# Patient Record
Sex: FEMALE | Race: WHITE | Employment: FULL TIME | URBAN - METROPOLITAN AREA
[De-identification: names, ages, dates, MRNs, and addresses within clinical notes are randomized per-mention and may not be internally consistent; named-entity substitution may affect disease eponyms.]

---

## 2018-06-27 ENCOUNTER — OFFICE VISIT (OUTPATIENT)
Dept: FAMILY MEDICINE CLINIC | Age: 38
End: 2018-06-27

## 2018-06-27 VITALS
BODY MASS INDEX: 20.41 KG/M2 | HEART RATE: 70 BPM | DIASTOLIC BLOOD PRESSURE: 64 MMHG | HEIGHT: 70 IN | WEIGHT: 142.6 LBS | OXYGEN SATURATION: 98 % | TEMPERATURE: 97.4 F | SYSTOLIC BLOOD PRESSURE: 110 MMHG

## 2018-06-27 DIAGNOSIS — Z12.83 SKIN EXAM FOR MALIGNANT NEOPLASM: ICD-10-CM

## 2018-06-27 DIAGNOSIS — B35.1 ONYCHOMYCOSIS: Primary | ICD-10-CM

## 2018-06-27 DIAGNOSIS — Z76.89 ESTABLISHING CARE WITH NEW DOCTOR, ENCOUNTER FOR: ICD-10-CM

## 2018-06-27 DIAGNOSIS — B35.3 TINEA PEDIS OF LEFT FOOT: ICD-10-CM

## 2018-06-27 PROCEDURE — 99203 OFFICE O/P NEW LOW 30 MIN: CPT | Performed by: FAMILY MEDICINE

## 2018-06-27 RX ORDER — TERBINAFINE HYDROCHLORIDE 250 MG/1
TABLET ORAL
Qty: 14 TABLET | Refills: 3 | Status: SHIPPED | OUTPATIENT
Start: 2018-06-27 | End: 2018-09-28 | Stop reason: SDUPTHER

## 2018-06-27 ASSESSMENT — PATIENT HEALTH QUESTIONNAIRE - PHQ9
2. FEELING DOWN, DEPRESSED OR HOPELESS: 0
SUM OF ALL RESPONSES TO PHQ QUESTIONS 1-9: 0
SUM OF ALL RESPONSES TO PHQ9 QUESTIONS 1 & 2: 0
1. LITTLE INTEREST OR PLEASURE IN DOING THINGS: 0

## 2018-06-27 ASSESSMENT — ENCOUNTER SYMPTOMS
BLOOD IN STOOL: 0
COUGH: 0
SHORTNESS OF BREATH: 0
CONSTIPATION: 0
ABDOMINAL PAIN: 0
VOMITING: 0
DIARRHEA: 0
NAUSEA: 0

## 2018-09-28 ENCOUNTER — OFFICE VISIT (OUTPATIENT)
Dept: FAMILY MEDICINE CLINIC | Age: 38
End: 2018-09-28
Payer: COMMERCIAL

## 2018-09-28 VITALS
WEIGHT: 145.2 LBS | DIASTOLIC BLOOD PRESSURE: 70 MMHG | TEMPERATURE: 96.4 F | SYSTOLIC BLOOD PRESSURE: 118 MMHG | OXYGEN SATURATION: 98 % | HEART RATE: 79 BPM | BODY MASS INDEX: 20.69 KG/M2

## 2018-09-28 DIAGNOSIS — B35.1 ONYCHOMYCOSIS: Primary | ICD-10-CM

## 2018-09-28 DIAGNOSIS — R21 RASH: ICD-10-CM

## 2018-09-28 PROCEDURE — 99213 OFFICE O/P EST LOW 20 MIN: CPT | Performed by: FAMILY MEDICINE

## 2018-09-28 PROCEDURE — 1036F TOBACCO NON-USER: CPT | Performed by: FAMILY MEDICINE

## 2018-09-28 PROCEDURE — G8427 DOCREV CUR MEDS BY ELIG CLIN: HCPCS | Performed by: FAMILY MEDICINE

## 2018-09-28 PROCEDURE — G8420 CALC BMI NORM PARAMETERS: HCPCS | Performed by: FAMILY MEDICINE

## 2018-09-28 RX ORDER — TERBINAFINE HYDROCHLORIDE 250 MG/1
TABLET ORAL
Qty: 14 TABLET | Refills: 3 | Status: SHIPPED | OUTPATIENT
Start: 2018-09-28 | End: 2020-11-03

## 2018-10-04 ENCOUNTER — TELEPHONE (OUTPATIENT)
Dept: FAMILY MEDICINE CLINIC | Age: 38
End: 2018-10-04

## 2018-10-04 RX ORDER — DOXYCYCLINE HYCLATE 100 MG
100 TABLET ORAL 2 TIMES DAILY
Qty: 14 TABLET | Refills: 0 | Status: SHIPPED | OUTPATIENT
Start: 2018-10-04 | End: 2018-10-11

## 2019-06-04 ENCOUNTER — HOSPITAL ENCOUNTER (OUTPATIENT)
Dept: MAMMOGRAPHY | Age: 39
Discharge: HOME | End: 2019-06-04
Payer: COMMERCIAL

## 2019-06-04 DIAGNOSIS — Z12.31 ENCOUNTER FOR SCREENING MAMMOGRAM FOR BREAST CANCER: ICD-10-CM

## 2019-06-04 PROCEDURE — 77067 SCR MAMMO BI INCL CAD: CPT

## 2020-11-03 ENCOUNTER — OFFICE VISIT (OUTPATIENT)
Dept: INTERNAL MEDICINE CLINIC | Age: 40
End: 2020-11-03
Payer: COMMERCIAL

## 2020-11-03 VITALS
DIASTOLIC BLOOD PRESSURE: 78 MMHG | RESPIRATION RATE: 14 BRPM | SYSTOLIC BLOOD PRESSURE: 123 MMHG | OXYGEN SATURATION: 98 % | BODY MASS INDEX: 21.42 KG/M2 | HEART RATE: 83 BPM | HEIGHT: 71 IN | WEIGHT: 153 LBS

## 2020-11-03 PROCEDURE — G8484 FLU IMMUNIZE NO ADMIN: HCPCS | Performed by: INTERNAL MEDICINE

## 2020-11-03 PROCEDURE — 99395 PREV VISIT EST AGE 18-39: CPT | Performed by: INTERNAL MEDICINE

## 2020-11-03 RX ORDER — CLOTRIMAZOLE AND BETAMETHASONE DIPROPIONATE 10; .64 MG/G; MG/G
CREAM TOPICAL
Qty: 15 G | Refills: 0 | Status: SHIPPED | OUTPATIENT
Start: 2020-11-03 | End: 2021-02-19

## 2020-11-03 RX ORDER — NORETHINDRONE ACETATE AND ETHINYL ESTRADIOL, ETHINYL ESTRADIOL AND FERROUS FUMARATE 1MG-10(24)
KIT ORAL
COMMUNITY
Start: 2020-10-31

## 2020-11-03 ASSESSMENT — PATIENT HEALTH QUESTIONNAIRE - PHQ9
SUM OF ALL RESPONSES TO PHQ QUESTIONS 1-9: 0
SUM OF ALL RESPONSES TO PHQ QUESTIONS 1-9: 0
1. LITTLE INTEREST OR PLEASURE IN DOING THINGS: 0
SUM OF ALL RESPONSES TO PHQ9 QUESTIONS 1 & 2: 0
2. FEELING DOWN, DEPRESSED OR HOPELESS: 0
DEPRESSION UNABLE TO ASSESS: FUNCTIONAL CAPACITY MOTIVATION LIMITS ACCURACY
SUM OF ALL RESPONSES TO PHQ QUESTIONS 1-9: 0

## 2020-11-03 NOTE — PROGRESS NOTES
Internal Medicine  History and Physical        Zia Gonzalez  YOB: 1980    Date of Service:  11/3/2020      Chief Complaint:   Zia Gonzalez is a 46 y.o. female who presents for a comprehensive physical    HPI:   Prev w Dr Milton Heimlich Dr Lorrin Mark and now w MARY Bang Maxim    No recent fasting lab. Father w hx of high chol, also HTN. mother w HTN and recent eval for transient global amnesia. The past month has noticed a red dry patch on forehead, can be itchy. We can t4ry therapy but if does not respond then has seen Adele Collins in the past.    Still very active, played volleyball in colleg at [place]. At times w some knee aches, still plays volleyball in a women's league. When is running at times will notice sl aching. Only started when was running over the summer and now resolved, still running. Preventive Care:  Health Maintenance   Topic Date Due    Varicella vaccine (1 of 2 - 2-dose childhood series) 11/30/1982    HIV screen  11/30/1996    DTaP/Tdap/Td vaccine (1 - Tdap) 11/30/2000    Cervical cancer screen  11/30/2002    Flu vaccine (1) 09/01/2020    Hepatitis A vaccine  Aged Out    Hepatitis B vaccine  Aged Out    Hib vaccine  Aged Out    Meningococcal (ACWY) vaccine  Aged Out    Pneumococcal 0-64 years Vaccine  Aged Out              No Known Allergies  Current Outpatient Medications   Medication Sig Dispense Refill    LO LOESTRIN FE 1 MG-10 MCG / 10 MCG tablet TAKE 1 TABLET BY MOUTH ONCE DAILY       No current facility-administered medications for this visit. History reviewed. No pertinent past medical history. History reviewed. No pertinent surgical history.   Family History   Problem Relation Age of Onset    High Blood Pressure Mother     High Blood Pressure Father     High Cholesterol Father      Social History     Socioeconomic History    Marital status:      Spouse name: Not on file    Number of children: Not on file    Years of education: Not on file    Highest education level: Not on file   Occupational History    Occupation:      Comment: [address] Financial resource strain: Not on file    Food insecurity     Worry: Not on file     Inability: Not on file   [employer] needs     Medical: Not on file     Non-medical: Not on file   Tobacco Use    Smoking status: Never Smoker    Smokeless tobacco: Never Used   Substance and Sexual Activity    Alcohol use: Yes     Comment: weekly    Drug use: No    Sexual activity: Yes     Partners: Male     Birth control/protection: Condom   Lifestyle    Physical activity     Days per week: Not on file     Minutes per session: Not on file    Stress: Not on file   Relationships    Social connections     Talks on phone: Not on file     Gets together: Not on file     Attends Scientologist service: Not on file     Active member of club or organization: Not on file     Attends meetings of clubs or organizations: Not on file     Relationship status: Not on file    Intimate partner violence     Fear of current or ex partner: Not on file     Emotionally abused: Not on file     Physically abused: Not on file     Forced sexual activity: Not on file   Other Topics Concern    Not on file   Social History Narrative    Not on file       Review of Systems:  A comprehensive review of systems was negative except for what was noted in the HPI. Wt Readings from Last 3 Encounters:   11/03/20 153 lb (69.4 kg)   09/28/18 145 lb 3.2 oz (65.9 kg)   06/27/18 142 lb 9.6 oz (64.7 kg)     BP Readings from Last 3 Encounters:   11/03/20 123/78   09/28/18 118/70   06/27/18 110/64       Physical Exam:   Vitals:    11/03/20 1416   BP: 123/78   Pulse: 83   Resp: 14   SpO2: 98%   Weight: 153 lb (69.4 kg)   Height: 5' 11\" (1.803 m)     Body mass index is 21.34 kg/m². Constitutional: She is oriented to person, place, and time. She appears well-developed and well-nourished.  No distress. HEENT:  Head: Normocephalic and atraumatic. Eyes: Conjunctivae and extraocular motions are normal. Pupils are equal, round, and reactive to light. Neck: Neck supple. No JVD present. No mass and no thyromegaly present. Cardiovascular: Normal rate, regular rhythm, normal heart sounds and intact distal pulses. Exam reveals no gallop and no friction rub. No murmur heard. Pulmonary/Chest: Effort normal and breath sounds normal. No respiratory distress. She has no wheezes, rhonchi or rales. Abdominal: Soft, non-tender. Bowel sounds and aorta are normal. She exhibits no organomegaly, mass or bruit. Musculoskeletal: Normal range of motion, no synovitis. She exhibits no edema. BILAT KNEES W/O EFFUSION OR LAXITY, CREPITUS  Neurological: She is alert and oriented to person, place, and time. No cranial nerve deficit. Skin: Skin is warm and dry. There is no rash or erythema. - EXCELT CENTRAL FOREHEAD W APPROX 2CM AREA TR ERYTHEMA AND SL RAISED, TR SCALING  Psychiatric: She has a normal mood and affect. Her speech is normal and behavior is normal. Judgment, cognition and memory are normal.         Assessment/Plan:  John Minor was seen today for established new doctor. Diagnoses and all orders for this visit:    Routine general medical examination at a health care facility - Overall general medical exam appears benign, other assessments as noted and testing is as noted below. -     Comprehensive Metabolic Panel; Future  -     CBC Auto Differential; Future  -     Lipid Panel; Future  -     TSH without Reflex; Future  -     VITAMIN D 25 HYDROXY; Future  -     TYPE AND SCREEN; Future    Benign skin lesion of forehead- SUSPECT MAY HAVE FUNGAL INFECTION, TRIAL LOTRISONE. ALSO DDX INCL ECZEMA AND STEROID IN LOTRISONE MAY HELP. HOWEVER IF NO BETTER 7-10D THEN FOR F/U W [facility] DERM  -     clotrimazole-betamethasone (LOTRISONE) 1-0.05 % cream; Apply topically 2 times daily.     Acute pain of both knees- LIKELY MILD TENDINITIS W NEW EXERCISE AND NOW RESOLVED. LESS LIKELY W OA.  HOWEVER IF SX RECUR AND ARE PERSISTENT, THEN CHECK XRAY  -     XR KNEE BILATERAL STANDING;  Future

## 2021-02-19 ENCOUNTER — OFFICE VISIT (OUTPATIENT)
Dept: INTERNAL MEDICINE CLINIC | Age: 41
End: 2021-02-19
Payer: COMMERCIAL

## 2021-02-19 VITALS
BODY MASS INDEX: 21.34 KG/M2 | RESPIRATION RATE: 15 BRPM | WEIGHT: 153 LBS | DIASTOLIC BLOOD PRESSURE: 84 MMHG | SYSTOLIC BLOOD PRESSURE: 132 MMHG | OXYGEN SATURATION: 98 % | HEART RATE: 83 BPM

## 2021-02-19 DIAGNOSIS — R03.0 ELEVATED BLOOD PRESSURE READING WITHOUT DIAGNOSIS OF HYPERTENSION: Primary | ICD-10-CM

## 2021-02-19 PROCEDURE — 99214 OFFICE O/P EST MOD 30 MIN: CPT | Performed by: INTERNAL MEDICINE

## 2021-02-19 PROCEDURE — G8484 FLU IMMUNIZE NO ADMIN: HCPCS | Performed by: INTERNAL MEDICINE

## 2021-02-19 PROCEDURE — G8420 CALC BMI NORM PARAMETERS: HCPCS | Performed by: INTERNAL MEDICINE

## 2021-02-19 PROCEDURE — G8427 DOCREV CUR MEDS BY ELIG CLIN: HCPCS | Performed by: INTERNAL MEDICINE

## 2021-02-19 PROCEDURE — 1036F TOBACCO NON-USER: CPT | Performed by: INTERNAL MEDICINE

## 2021-02-19 ASSESSMENT — PATIENT HEALTH QUESTIONNAIRE - PHQ9
1. LITTLE INTEREST OR PLEASURE IN DOING THINGS: 0
DEPRESSION UNABLE TO ASSESS: FUNCTIONAL CAPACITY MOTIVATION LIMITS ACCURACY
SUM OF ALL RESPONSES TO PHQ QUESTIONS 1-9: 0

## 2021-02-19 NOTE — PATIENT INSTRUCTIONS
Patient Education        DASH Diet: Care Instructions  Your Care Instructions     The DASH diet is an eating plan that can help lower your blood pressure. DASH stands for Dietary Approaches to Stop Hypertension. Hypertension is high blood pressure. The DASH diet focuses on eating foods that are high in calcium, potassium, and magnesium. These nutrients can lower blood pressure. The foods that are highest in these nutrients are fruits, vegetables, low-fat dairy products, nuts, seeds, and legumes. But taking calcium, potassium, and magnesium supplements instead of eating foods that are high in those nutrients does not have the same effect. The DASH diet also includes whole grains, fish, and poultry. The DASH diet is one of several lifestyle changes your doctor may recommend to lower your high blood pressure. Your doctor may also want you to decrease the amount of sodium in your diet. Lowering sodium while following the DASH diet can lower blood pressure even further than just the DASH diet alone. Follow-up care is a key part of your treatment and safety. Be sure to make and go to all appointments, and call your doctor if you are having problems. It's also a good idea to know your test results and keep a list of the medicines you take. How can you care for yourself at home? Following the DASH diet  · Eat 4 to 5 servings of fruit each day. A serving is 1 medium-sized piece of fruit, ½ cup chopped or canned fruit, 1/4 cup dried fruit, or 4 ounces (½ cup) of fruit juice. Choose fruit more often than fruit juice. · Eat 4 to 5 servings of vegetables each day. A serving is 1 cup of lettuce or raw leafy vegetables, ½ cup of chopped or cooked vegetables, or 4 ounces (½ cup) of vegetable juice. Choose vegetables more often than vegetable juice. · Get 2 to 3 servings of low-fat and fat-free dairy each day. A serving is 8 ounces of milk, 1 cup of yogurt, or 1 ½ ounces of cheese. · Eat 6 to 8 servings of grains each day. meals using beans and peas. Add garbanzo or kidney beans to salads. Make burritos and tacos with mashed mast beans or black beans. Where can you learn more? Go to [email or web address] org and sign in to your [employer] account. Enter F925 in the [facility] box to learn more about \"DASH Diet: Care Instructions. \"     If you do not have an account, please click on the \"Sign Up Now\" link. Current as of: December 16, 2019               Content Version: 12.6  © 5362-1637 [employer], [employer]. Care instructions adapted under license by [facility] ([facility]). If you have questions about a medical condition or this instruction, always ask your healthcare professional. [address] any warranty or liability for your use of this information. wt loss would help lower BP significantly, also a low salt diet. Also rec to restart regular cardiovascular exercise, aim for minimum of 30min/day, or more if possible. rec to aim for <2 grams/2000mg a day. Try to cut down coffee to either decaf or one caffeinated/day. Recommend also a BP cuff at home, track a few times/week and record. If consistently high >140/90 then call us back. Otherwise if 130-140 range we'll monitor for now as we work on lifestyle changes. Also please let us know your BP trend, ranges in the next 2 weeks.

## 2021-02-19 NOTE — PROGRESS NOTES
Edie Mcwilliams  1980 02/19/21    SUBJECTIVE:  At GYN- Vivien Minor CNP- visit  2-3 weeks ago BP was high to 138/88. Then recheck was the same, a week later also 138/88. Has no sx cp or sob, lt headed, blurred vision. Very strong fhx of HTN in both parents and also brother at 23yo. Reviewed BP since 2018, has trended higher gradually the past 3 yrs. Had COVID infection in Nov w some fatigue afterwards and has not been exercising now as regularly. Typically runs outside. Been on octs the past yr. Caffeine 1-2 cups/day, alc one wine or white claw 3-4x/week. 142/90 L arm    OBJECTIVE:    /84   Pulse 83   Resp 15   Wt 153 lb (69.4 kg)   SpO2 98%   BMI 21.34 kg/m²     Physical Exam  Vitals signs reviewed. Constitutional:       Appearance: She is well-developed. HENT:      Head: Normocephalic and atraumatic. Eyes:      General: No scleral icterus. Right eye: No discharge. Left eye: No discharge. Conjunctiva/sclera: Conjunctivae normal.      Pupils: Pupils are equal, round, and reactive to light. Neck:      Musculoskeletal: Neck supple. Thyroid: No thyromegaly. Vascular: No JVD. Trachea: No tracheal deviation. Cardiovascular:      Rate and Rhythm: Normal rate and regular rhythm. Heart sounds: Normal heart sounds. No murmur. No friction rub. No gallop. Pulmonary:      Effort: Pulmonary effort is normal. No respiratory distress. Breath sounds: Normal breath sounds. No wheezing or rales. Abdominal:      General: Bowel sounds are normal. There is no distension. Palpations: Abdomen is soft. There is no mass. Tenderness: There is no abdominal tenderness. There is no guarding or rebound. Hernia: No hernia is present. Lymphadenopathy:      Cervical: No cervical adenopathy. Skin:     General: Skin is warm and dry. Neurological:      Mental Status: She is alert.    Psychiatric:         Mood and Affect: Mood normal. ASSESSMENT:    1. Elevated blood pressure reading without diagnosis of hypertension        PLAN:    Abimbola Holguin was seen today for hypertension. Diagnoses and all orders for this visit:    Elevated blood pressure reading without diagnosis of hypertension- CERTAINLY W UNDERLYING GENETIC PREDISPOSITION TO ESSENTIAL HTN, PERHAPS ALSO BROUGHT ON BY LESS ACTIVITY AND SOME WT GAIN AFTER COVID. THERE ARE LIKELY MULTIFACTORIAL ISSUES RELATED TO - IN SMALL PART- HER OCPS, ALSO MILD ALC CONSUMPTION, HAS MINIMAL FAST FOOD BUT COULD HAVE SODIUM INTAKE THAT MAY BE LOWERED TO <2G/DAY. FOR NOW SHE WILL CONT MONITOR BP AT HOME, WORK ON LOW SODIUM AND DASH DIET, ALSO START DAILY EXERCISE W AIM TO LOSE ~10q# IF POSSIBLE AND WE'LL REASSESS IN THREE MONTHS. IF BP REMAINS CONSISTENTLY HIGH WE'LL THEN NEED TO CONSIDER POSSIBLE MEDICAL THERAPY. CHECKING LAB ALSO FOR R/O SECONDARY CAUSES. -     Urinalysis with Microscopic; Future  -     Comprehensive Metabolic Panel; Future  -     CBC Auto Differential; Future  -     Lipid Panel; Future  -     TSH without Reflex; Future  -     Urinalysis with Microscopic; Future    PT INSTRUCTIONS:  wt loss would help lower BP significantly, also a low salt diet. Also rec to restart regular cardiovascular exercise, aim for minimum of 30min/day, or more if possible. rec to aim for <2 grams/2000mg a day. Try to cut down coffee to either decaf or one caffeinated/day.

## 2021-04-09 DIAGNOSIS — N39.0 URINARY TRACT INFECTION WITHOUT HEMATURIA, SITE UNSPECIFIED: Primary | ICD-10-CM

## 2021-04-09 RX ORDER — CIPROFLOXACIN 250 MG/1
250 TABLET, FILM COATED ORAL 2 TIMES DAILY
Qty: 10 TABLET | Refills: 0 | Status: SHIPPED | OUTPATIENT
Start: 2021-04-09 | End: 2021-04-14

## 2021-05-19 ENCOUNTER — OFFICE VISIT (OUTPATIENT)
Dept: INTERNAL MEDICINE CLINIC | Age: 41
End: 2021-05-19
Payer: COMMERCIAL

## 2021-05-19 VITALS
OXYGEN SATURATION: 98 % | HEART RATE: 71 BPM | SYSTOLIC BLOOD PRESSURE: 122 MMHG | DIASTOLIC BLOOD PRESSURE: 78 MMHG | WEIGHT: 153 LBS | RESPIRATION RATE: 14 BRPM | BODY MASS INDEX: 21.34 KG/M2

## 2021-05-19 DIAGNOSIS — R03.0 BLOOD PRESSURE ELEVATED WITHOUT HISTORY OF HTN: Primary | ICD-10-CM

## 2021-05-19 DIAGNOSIS — N30.00 ACUTE CYSTITIS WITHOUT HEMATURIA: ICD-10-CM

## 2021-05-19 LAB
BILIRUBIN URINE: NEGATIVE
BLOOD, URINE: NEGATIVE
CLARITY: CLEAR
COLOR: ABNORMAL
EPITHELIAL CELLS, UA: 1 /HPF (ref 0–5)
GLUCOSE URINE: NEGATIVE MG/DL
HYALINE CASTS: 1 /LPF (ref 0–8)
KETONES, URINE: NEGATIVE MG/DL
LEUKOCYTE ESTERASE, URINE: NEGATIVE
MICROSCOPIC EXAMINATION: YES
NITRITE, URINE: NEGATIVE
PH UA: 6 (ref 5–8)
PROTEIN UA: 30 MG/DL
RBC UA: 2 /HPF (ref 0–4)
SPECIFIC GRAVITY UA: 1.02 (ref 1–1.03)
URINE TYPE: ABNORMAL
UROBILINOGEN, URINE: 0.2 E.U./DL
WBC UA: 1 /HPF (ref 0–5)

## 2021-05-19 PROCEDURE — G8427 DOCREV CUR MEDS BY ELIG CLIN: HCPCS | Performed by: INTERNAL MEDICINE

## 2021-05-19 PROCEDURE — G8420 CALC BMI NORM PARAMETERS: HCPCS | Performed by: INTERNAL MEDICINE

## 2021-05-19 PROCEDURE — 1036F TOBACCO NON-USER: CPT | Performed by: INTERNAL MEDICINE

## 2021-05-19 PROCEDURE — 99213 OFFICE O/P EST LOW 20 MIN: CPT | Performed by: INTERNAL MEDICINE

## 2021-05-19 NOTE — PROGRESS NOTES
Suzanne Andrade  1980 05/19/21    SUBJECTIVE:    HAS HAD WT LOSS AND ALSO WORKING ON LOW SODIUM. AT HOME BP ~120S. COUNTING SODIUM TOO AND HAS CUT OUT/DOWN BREADS, CHEESES, PROCESSED MEATS. HAS LESS ETOH AND CAFFEINE INTAKE, NOW WORKING OUT 5D/WEEK. HAD MILD UTI - ONLY FR UA AND NOT SX- LAST VISIT IN APR, STARTED COURSE ABX. NO SX NOTED. OBJECTIVE:    /78   Pulse 71   Resp 14   Wt 153 lb (69.4 kg)   SpO2 98%   BMI 21.34 kg/m²     Physical Exam  Vitals reviewed. Constitutional:       Appearance: She is well-developed. Cardiovascular:      Rate and Rhythm: Normal rate and regular rhythm. Heart sounds: Normal heart sounds. No murmur heard. No friction rub. No gallop. Pulmonary:      Effort: Pulmonary effort is normal. No respiratory distress. Breath sounds: Normal breath sounds. No wheezing or rales. Abdominal:      General: Bowel sounds are normal. There is no distension. Palpations: Abdomen is soft. Tenderness: There is no abdominal tenderness. Musculoskeletal:      Cervical back: Neck supple. Right lower leg: No edema. Left lower leg: No edema. Neurological:      Mental Status: She is alert. ASSESSMENT:    1. Blood pressure elevated without history of HTN    2. Acute cystitis without hematuria        PLAN:    Mingo Peters was seen today for blood pressure check. Diagnoses and all orders for this visit:    Blood pressure elevated without history of HTN- BP HAS NORMALIZED W AGGRESSIVE LIFESTYLE CHANGES, CUTTING DOWN ON CARBS, SODIUM, ALSO INCR EXERCISE     Acute cystitis without hematuria- RECHECK UA TO SEE IF PRIOR INFECTION CLEARED  -     Urinalysis with Microscopic;  Future    CHANGE NEXT APPT ONE YR FOR PHYSICAL SINCE RECENT LABS AND EVAL BENIGN

## 2021-05-20 DIAGNOSIS — R80.9 PROTEINURIA, UNSPECIFIED TYPE: Primary | ICD-10-CM

## 2021-05-20 NOTE — RESULT ENCOUNTER NOTE
Call pt, urine indicates prior infection resolved. There may be ?trace protein present? ADD URINE MICROALBUMIN, WHICH IS A MORE SENSITIVE TEST.   IF THIS IS ABNORMAL WE MAY NEED SOME ADDITIONAL F/U TESTS BUT IF IS NEGATIVE THEN THE TRACE PROTEIN IS JUST A FALSE POSITIVE AND WE'LL LET HER KNOW WHEN  RESULTS ARE BACK    DX PROTEINURIA

## 2021-05-21 ENCOUNTER — HOSPITAL ENCOUNTER (OUTPATIENT)
Dept: MAMMOGRAPHY | Age: 41
Discharge: HOME | End: 2021-05-21
Payer: COMMERCIAL

## 2021-05-21 DIAGNOSIS — Z12.31 VISIT FOR SCREENING MAMMOGRAM: ICD-10-CM

## 2021-05-21 PROCEDURE — 77067 SCR MAMMO BI INCL CAD: CPT

## 2021-12-21 ENCOUNTER — OFFICE VISIT (OUTPATIENT)
Dept: INTERNAL MEDICINE CLINIC | Age: 41
End: 2021-12-21
Payer: COMMERCIAL

## 2021-12-21 VITALS
DIASTOLIC BLOOD PRESSURE: 85 MMHG | WEIGHT: 146 LBS | OXYGEN SATURATION: 99 % | RESPIRATION RATE: 14 BRPM | HEIGHT: 71 IN | HEART RATE: 110 BPM | SYSTOLIC BLOOD PRESSURE: 137 MMHG | BODY MASS INDEX: 20.44 KG/M2

## 2021-12-21 DIAGNOSIS — R59.0 AXILLARY LYMPHADENOPATHY: Primary | ICD-10-CM

## 2021-12-21 PROCEDURE — 1036F TOBACCO NON-USER: CPT | Performed by: INTERNAL MEDICINE

## 2021-12-21 PROCEDURE — G8420 CALC BMI NORM PARAMETERS: HCPCS | Performed by: INTERNAL MEDICINE

## 2021-12-21 PROCEDURE — 99213 OFFICE O/P EST LOW 20 MIN: CPT | Performed by: INTERNAL MEDICINE

## 2021-12-21 PROCEDURE — G8484 FLU IMMUNIZE NO ADMIN: HCPCS | Performed by: INTERNAL MEDICINE

## 2021-12-21 PROCEDURE — G8427 DOCREV CUR MEDS BY ELIG CLIN: HCPCS | Performed by: INTERNAL MEDICINE

## 2021-12-21 NOTE — PROGRESS NOTES
Sean Monreal  1980 12/21/21    SUBJECTIVE:    Switched to an alternative deodorant in June which caused some irritation and a couple of small lumps. Has since switched to a different deodorant. No itching, never had any visible rash. No fever, chills, breast lumps. Is coming due soon for GYN eval.    She has fhx of breast CA MGM, did have benign mammogram this summer. Is unvaccinated so no shot to cause any LAD. OBJECTIVE:    /85   Pulse 110   Resp 14   Ht 5' 11\" (1.803 m)   Wt 146 lb (66.2 kg)   SpO2 99%   BMI 20.36 kg/m²     Physical Exam  Constitutional:       Appearance: Normal appearance. Musculoskeletal:      Comments: Sl swelling, ?tendinous vs lymph node swelling at distal axillary region   Skin:     Findings: No erythema or rash. Neurological:      Mental Status: She is alert. ASSESSMENT:    1. Axillary lymphadenopathy        PLAN:    Russell Bliss was seen today for cyst.    Diagnoses and all orders for this visit:    Axillary lymphadenopathy- unsure if swollen area is significant LN/lymphadenopathy, set up for eval fr surgery.   Cont f/u also routine mammograms thr her OB/GYN  -     Chris Patel MD, General Surgery, [facility]

## 2022-01-13 ENCOUNTER — OFFICE VISIT (OUTPATIENT)
Dept: SURGERY | Age: 42
End: 2022-01-13
Payer: COMMERCIAL

## 2022-01-13 VITALS
HEART RATE: 72 BPM | BODY MASS INDEX: 20.35 KG/M2 | DIASTOLIC BLOOD PRESSURE: 70 MMHG | WEIGHT: 145.4 LBS | SYSTOLIC BLOOD PRESSURE: 134 MMHG | HEIGHT: 71 IN | OXYGEN SATURATION: 99 %

## 2022-01-13 DIAGNOSIS — M79.89 LEFT AXILLARY SWELLING: Primary | ICD-10-CM

## 2022-01-13 PROCEDURE — G8484 FLU IMMUNIZE NO ADMIN: HCPCS | Performed by: PHYSICIAN ASSISTANT

## 2022-01-13 PROCEDURE — 99203 OFFICE O/P NEW LOW 30 MIN: CPT | Performed by: PHYSICIAN ASSISTANT

## 2022-01-13 PROCEDURE — G8427 DOCREV CUR MEDS BY ELIG CLIN: HCPCS | Performed by: PHYSICIAN ASSISTANT

## 2022-01-13 PROCEDURE — G8420 CALC BMI NORM PARAMETERS: HCPCS | Performed by: PHYSICIAN ASSISTANT

## 2022-01-13 PROCEDURE — 1036F TOBACCO NON-USER: CPT | Performed by: PHYSICIAN ASSISTANT

## 2022-01-13 ASSESSMENT — PATIENT HEALTH QUESTIONNAIRE - PHQ9
1. LITTLE INTEREST OR PLEASURE IN DOING THINGS: 0
2. FEELING DOWN, DEPRESSED OR HOPELESS: 0
SUM OF ALL RESPONSES TO PHQ QUESTIONS 1-9: 0
SUM OF ALL RESPONSES TO PHQ QUESTIONS 1-9: 0
SUM OF ALL RESPONSES TO PHQ9 QUESTIONS 1 & 2: 0
SUM OF ALL RESPONSES TO PHQ QUESTIONS 1-9: 0
SUM OF ALL RESPONSES TO PHQ QUESTIONS 1-9: 0

## 2022-01-13 NOTE — PROGRESS NOTES
Chief Complaint   Patient presents with    New Patient     NP-Axillary Lymphademophay L side        SUBJECTIVE:  HPI: Patient presents today with complaints of L axillary swelling concern for lymphadenopathy vs cyst as recommended for consult by her PCP. She has had these \"lumps\" since about June of last year. At that time, she changed her deodorant, and had a local reaction with redness, itching and swelling. At that time, while the patient was scratching her axilla, she noticed 2 small lumps. She has since changed her deodorant, and no longer has symptoms, but the lumps remain. They are unchanged. Pt denies any unexpected weight loss, pain, illness, drainage or size change in the lumps. I have reviewed the patient's (pertinent information to this visit) medical history, family history (scanned in  the Media tab under \"patient questioner\"), social history and review of systems with the patient today in the office. No past surgical history on file. No past medical history on file.   Family History   Problem Relation Age of Onset    High Blood Pressure Mother     High Blood Pressure Father     High Cholesterol Father     High Blood Pressure Brother     Breast Cancer Maternal Grandmother      Social History     Socioeconomic History    Marital status:      Spouse name: Not on file    Number of children: Not on file    Years of education: Not on file    Highest education level: Not on file   Occupational History    Occupation:      Comment: [employer]   Tobacco Use    Smoking status: Never Smoker    Smokeless tobacco: Never Used   Substance and Sexual Activity    Alcohol use: Yes     Comment: weekly    Drug use: No    Sexual activity: Yes     Partners: Male     Birth control/protection: Condom   Other Topics Concern    Not on file   Social History Narrative    Not on file     Social Determinants of Health     Financial Resource Strain:     Difficulty of Paying Living Expenses: Not on file   Food Insecurity:     Worried About Running Out of Food in the Last Year: Not on file    Ran Out of Food in the Last Year: Not on file   Transportation Needs:     Lack of Transportation (Medical): Not on file    Lack of Transportation (Non-Medical): Not on file   Physical Activity:     Days of Exercise per Week: Not on file    Minutes of Exercise per Session: Not on file   Stress:     Feeling of Stress : Not on file   Social Connections:     Frequency of Communication with Friends and Family: Not on file    Frequency of Social Gatherings with Friends and Family: Not on file    Attends Anabaptism Services: Not on file    Active Member of [address] [place] [address] Class[employer]s or Organizations: Not on file    Attends Club or Organization Meetings: Not on file    Marital Status: Not on file   Intimate Partner Violence:     Fear of Current or Ex-Partner: Not on file    Emotionally Abused: Not on file    Physically Abused: Not on file    Sexually Abused: Not on file   Housing Stability:     Unable to Pay for Housing in the Last Year: Not on file    Number of [place] in the Last Year: Not on file    Unstable Housing in the Last Year: Not on file       Current Outpatient Medications   Medication Sig Dispense Refill    LO LOESTRIN FE 1 MG-10 MCG / 10 MCG tablet TAKE 1 TABLET BY MOUTH ONCE DAILY       No current facility-administered medications for this visit. No Known Allergies    Review of Systems:       Review of Systems   Constitutional: Negative for chills and fever. HENT: Negative for ear pain, mouth sores, sore throat and tinnitus. Eyes: Negative for photophobia, redness and itching. Respiratory: Negative for apnea, choking and stridor. Cardiovascular: Negative for chest pain and palpitations. Gastrointestinal: Negative for anal bleeding, constipation and rectal pain. Endocrine: Negative for polydipsia. Genitourinary: Negative for enuresis, flank pain and hematuria. Musculoskeletal: Negative for back pain, joint swelling and myalgias. Skin: Negative for color change and pallor. L Axilla \"lumps\"   Allergic/Immunologic: Negative for environmental allergies. Neurological: Negative for syncope and speech difficulty. Psychiatric/Behavioral: Negative for confusion and hallucinations. OBJECTIVE:  Physical Exam:    /70 (Site: Left Upper Arm, Position: Sitting, Cuff Size: Medium Adult)   Pulse 72   Ht 5' 11\" (1.803 m)   Wt 145 lb 6.4 oz (66 kg)   SpO2 99%   BMI 20.28 kg/m²      Physical Exam  Constitutional:       Appearance: She is well-developed. HENT:      Head: Normocephalic. Eyes:      Pupils: Pupils are equal, round, and reactive to light. Cardiovascular:      Rate and Rhythm: Normal rate. Pulmonary:      Effort: Pulmonary effort is normal.   Chest:   Breasts:      Left: No axillary adenopathy or supraclavicular adenopathy. Abdominal:      General: There is no distension. Palpations: Abdomen is soft. There is no mass. Tenderness: There is no abdominal tenderness. There is no guarding or rebound. Musculoskeletal:         General: Normal range of motion. Cervical back: Normal range of motion and neck supple. Lymphadenopathy:      Head:      Left side of head: No submental, submandibular, tonsillar, preauricular, posterior auricular or occipital adenopathy. Cervical: No cervical adenopathy. Left cervical: No superficial, deep or posterior cervical adenopathy. Upper Body:      Left upper body: No supraclavicular or axillary adenopathy. Skin:     General: Skin is warm. Neurological:      Mental Status: She is alert and oriented to person, place, and time. ASSESSMENT:  1. Left axillary swelling          PLAN:  Treatment:  Will get soft tissue US for further evaluation.  Low clinical suspicion for lymphadenopathy or sebaceous cyst.  Patient counseled on risks, benefits, and alternatives of treatment plan at length. Patient states an understanding and willingness to proceed with plan. Orders Placed This Encounter   Procedures    US SOFT TISSUE LIMITED AREA        No orders of the defined types were placed in this encounter. Follow Up:  Return in about 2 weeks (around 1/27/2022) for imaging review.       Myriam Saxena PA-C

## 2022-01-14 ASSESSMENT — ENCOUNTER SYMPTOMS
CONSTIPATION: 0
APNEA: 0
EYE ITCHING: 0
PHOTOPHOBIA: 0
STRIDOR: 0
BACK PAIN: 0
CHOKING: 0
SORE THROAT: 0
RECTAL PAIN: 0
COLOR CHANGE: 0
EYE REDNESS: 0
ANAL BLEEDING: 0

## 2022-01-20 ENCOUNTER — HOSPITAL ENCOUNTER (OUTPATIENT)
Dept: ULTRASOUND IMAGING | Age: 42
Discharge: HOME | End: 2022-01-20
Payer: COMMERCIAL

## 2022-01-20 DIAGNOSIS — M79.89 LEFT AXILLARY SWELLING: ICD-10-CM

## 2022-01-20 PROCEDURE — 76882 US LMTD JT/FCL EVL NVASC XTR: CPT

## 2023-10-04 SDOH — ECONOMIC STABILITY: TRANSPORTATION INSECURITY
IN THE PAST 12 MONTHS, HAS LACK OF TRANSPORTATION KEPT YOU FROM MEETINGS, WORK, OR FROM GETTING THINGS NEEDED FOR DAILY LIVING?: NO

## 2023-10-04 SDOH — ECONOMIC STABILITY: INCOME INSECURITY: HOW HARD IS IT FOR YOU TO PAY FOR THE VERY BASICS LIKE FOOD, HOUSING, MEDICAL CARE, AND HEATING?: NOT HARD AT ALL

## 2023-10-04 SDOH — ECONOMIC STABILITY: FOOD INSECURITY: WITHIN THE PAST 12 MONTHS, THE FOOD YOU BOUGHT JUST DIDN'T LAST AND YOU DIDN'T HAVE MONEY TO GET MORE.: NEVER TRUE

## 2023-10-04 SDOH — ECONOMIC STABILITY: HOUSING INSECURITY
IN THE LAST 12 MONTHS, WAS THERE A TIME WHEN YOU DID NOT HAVE A STEADY PLACE TO SLEEP OR SLEPT IN A SHELTER (INCLUDING NOW)?: NO

## 2023-10-04 SDOH — ECONOMIC STABILITY: FOOD INSECURITY: WITHIN THE PAST 12 MONTHS, YOU WORRIED THAT YOUR FOOD WOULD RUN OUT BEFORE YOU GOT MONEY TO BUY MORE.: NEVER TRUE

## 2023-10-04 ASSESSMENT — PATIENT HEALTH QUESTIONNAIRE - PHQ9
1. LITTLE INTEREST OR PLEASURE IN DOING THINGS: NOT AT ALL
SUM OF ALL RESPONSES TO PHQ QUESTIONS 1-9: 0
1. LITTLE INTEREST OR PLEASURE IN DOING THINGS: 0
SUM OF ALL RESPONSES TO PHQ9 QUESTIONS 1 & 2: 0
2. FEELING DOWN, DEPRESSED OR HOPELESS: 0
SUM OF ALL RESPONSES TO PHQ9 QUESTIONS 1 & 2: 0
2. FEELING DOWN, DEPRESSED OR HOPELESS: NOT AT ALL
SUM OF ALL RESPONSES TO PHQ QUESTIONS 1-9: 0

## 2023-10-05 ENCOUNTER — OFFICE VISIT (OUTPATIENT)
Dept: INTERNAL MEDICINE CLINIC | Age: 43
End: 2023-10-05
Payer: COMMERCIAL

## 2023-10-05 VITALS
OXYGEN SATURATION: 99 % | SYSTOLIC BLOOD PRESSURE: 118 MMHG | BODY MASS INDEX: 20.94 KG/M2 | HEART RATE: 62 BPM | DIASTOLIC BLOOD PRESSURE: 62 MMHG | WEIGHT: 149.6 LBS | HEIGHT: 71 IN

## 2023-10-05 DIAGNOSIS — R59.0 AXILLARY LYMPHADENOPATHY: Primary | ICD-10-CM

## 2023-10-05 DIAGNOSIS — Z00.00 PREVENTATIVE HEALTH CARE: ICD-10-CM

## 2023-10-05 DIAGNOSIS — M79.622 PAIN IN LEFT AXILLA: ICD-10-CM

## 2023-10-05 PROCEDURE — G8484 FLU IMMUNIZE NO ADMIN: HCPCS | Performed by: INTERNAL MEDICINE

## 2023-10-05 PROCEDURE — 99213 OFFICE O/P EST LOW 20 MIN: CPT | Performed by: INTERNAL MEDICINE

## 2023-10-05 PROCEDURE — G8427 DOCREV CUR MEDS BY ELIG CLIN: HCPCS | Performed by: INTERNAL MEDICINE

## 2023-10-05 PROCEDURE — 1036F TOBACCO NON-USER: CPT | Performed by: INTERNAL MEDICINE

## 2023-10-05 PROCEDURE — G8420 CALC BMI NORM PARAMETERS: HCPCS | Performed by: INTERNAL MEDICINE

## 2024-01-24 ENCOUNTER — HOSPITAL ENCOUNTER (OUTPATIENT)
Dept: GENERAL RADIOLOGY | Age: 44
Discharge: HOME | End: 2024-01-24
Payer: COMMERCIAL

## 2024-01-24 ENCOUNTER — HOSPITAL ENCOUNTER (OUTPATIENT)
Age: 44
Discharge: HOME | End: 2024-01-24
Payer: COMMERCIAL

## 2024-01-24 DIAGNOSIS — R59.0 AXILLARY LYMPHADENOPATHY: ICD-10-CM

## 2024-01-24 DIAGNOSIS — Z00.00 PREVENTATIVE HEALTH CARE: ICD-10-CM

## 2024-01-24 DIAGNOSIS — M79.622 PAIN IN LEFT AXILLA: ICD-10-CM

## 2024-01-24 LAB
ALBUMIN SERPL-MCNC: 4.3 GM/DL (ref 3.4–5)
ALP BLD-CCNC: 54 IU/L (ref 40–128)
ALT SERPL-CCNC: 18 U/L (ref 10–40)
ANION GAP SERPL CALCULATED.3IONS-SCNC: 8 MMOL/L (ref 7–16)
AST SERPL-CCNC: 20 IU/L (ref 15–37)
BASOPHILS ABSOLUTE: 0.1 K/CU MM
BASOPHILS RELATIVE PERCENT: 1.2 % (ref 0–1)
BILIRUB SERPL-MCNC: 0.4 MG/DL (ref 0–1)
BUN SERPL-MCNC: 13 MG/DL (ref 6–23)
CALCIUM SERPL-MCNC: 8.8 MG/DL (ref 8.3–10.6)
CHLORIDE BLD-SCNC: 103 MMOL/L (ref 99–110)
CHOLEST SERPL-MCNC: 170 MG/DL
CO2: 30 MMOL/L (ref 21–32)
CREAT SERPL-MCNC: 0.7 MG/DL (ref 0.6–1.1)
CRP SERPL HS-MCNC: 3 MG/L
DIFFERENTIAL TYPE: ABNORMAL
EOSINOPHILS ABSOLUTE: 0.1 K/CU MM
EOSINOPHILS RELATIVE PERCENT: 2 % (ref 0–3)
GFR SERPL CREATININE-BSD FRML MDRD: >60 ML/MIN/1.73M2
GLUCOSE SERPL-MCNC: 90 MG/DL (ref 70–99)
HCT VFR BLD CALC: 44.1 % (ref 37–47)
HDLC SERPL-MCNC: 68 MG/DL
HEMOGLOBIN: 14.3 GM/DL (ref 12.5–16)
IMMATURE NEUTROPHIL %: 0.2 % (ref 0–0.43)
LDLC SERPL CALC-MCNC: 93 MG/DL
LYMPHOCYTES ABSOLUTE: 1.3 K/CU MM
LYMPHOCYTES RELATIVE PERCENT: 32.8 % (ref 24–44)
MCH RBC QN AUTO: 31.2 PG (ref 27–31)
MCHC RBC AUTO-ENTMCNC: 32.4 % (ref 32–36)
MCV RBC AUTO: 96.1 FL (ref 78–100)
MONOCYTES ABSOLUTE: 0.4 K/CU MM
MONOCYTES RELATIVE PERCENT: 10.4 % (ref 0–4)
NUCLEATED RBC %: 0 %
PDW BLD-RTO: 11.3 % (ref 11.7–14.9)
PLATELET # BLD: 276 K/CU MM (ref 140–440)
PMV BLD AUTO: 11.1 FL (ref 7.5–11.1)
POTASSIUM SERPL-SCNC: 4.7 MMOL/L (ref 3.5–5.1)
RBC # BLD: 4.59 M/CU MM (ref 4.2–5.4)
SEGMENTED NEUTROPHILS ABSOLUTE COUNT: 2.2 K/CU MM
SEGMENTED NEUTROPHILS RELATIVE PERCENT: 53.4 % (ref 36–66)
SODIUM BLD-SCNC: 141 MMOL/L (ref 135–145)
TOTAL IMMATURE NEUTOROPHIL: 0.01 K/CU MM
TOTAL NUCLEATED RBC: 0 K/CU MM
TOTAL PROTEIN: 6.2 GM/DL (ref 6.4–8.2)
TRIGL SERPL-MCNC: 44 MG/DL
TSH SERPL DL<=0.005 MIU/L-ACNC: 0.97 UIU/ML (ref 0.27–4.2)
WBC # BLD: 4 K/CU MM (ref 4–10.5)

## 2024-01-24 PROCEDURE — 80061 LIPID PANEL: CPT

## 2024-01-24 PROCEDURE — 36415 COLL VENOUS BLD VENIPUNCTURE: CPT

## 2024-01-24 PROCEDURE — 80053 COMPREHEN METABOLIC PANEL: CPT

## 2024-01-24 PROCEDURE — 84443 ASSAY THYROID STIM HORMONE: CPT

## 2024-01-24 PROCEDURE — 86140 C-REACTIVE PROTEIN: CPT

## 2024-01-24 PROCEDURE — 71046 X-RAY EXAM CHEST 2 VIEWS: CPT

## 2024-01-24 PROCEDURE — 85025 COMPLETE CBC W/AUTO DIFF WBC: CPT

## 2024-01-25 DIAGNOSIS — D72.821 MONOCYTOSIS: Primary | ICD-10-CM

## 2024-01-25 NOTE — RESULT ENCOUNTER NOTE
Call pt, labs ok/chol ok and also w normal inflammation level, thyroid fxn  her chol levels are actually excellent at 170.  Only minor issue is a slight percentage shift in monocytes which is a portion of her white blood cells but I think this is just a lab error since the absolute #s which are more important, are normal.  DO SUGGEST TO ADD FLOW CYTOMETRY (LEUKEMIA/LYMPHOMA) DX MONOCYTOSIS, TO BE SURE OF NO ABNORMAL CELLS AND IF THIS COMES BACK NL WE'LL JUST PLAN ON A GENERAL LAB CHECK AGAIN- ANNUAL PHYSICAL- IN ONE YR., pls give her the option to schedule- or can call us back next yr, since no appt is currently scheduled.

## 2024-02-19 ENCOUNTER — PATIENT MESSAGE (OUTPATIENT)
Dept: INTERNAL MEDICINE CLINIC | Age: 44
End: 2024-02-19

## 2024-02-19 DIAGNOSIS — D72.821 MONOCYTOSIS: Primary | ICD-10-CM

## 2024-02-20 NOTE — TELEPHONE ENCOUNTER
From: Janki Snyder  To: Dr. Francisco J Barbosa  Sent: 2/19/2024 6:37 PM EST  Subject: Blood sample     There is a message about a FLOW CYTOMETRY LEUKEMIA/LYMPHOMA BLOOD test. When the nurse called to tell me about the initial blood test results, she mentioned you wanting to run another panel, but that I wouldn't need to go back in because they could use my most recent sample. Is this still the case or do I need to go back and have more blood drawn?     Also, I should probably mention that when I did have my blood drawn last, I had not fasted prior. I did not know that was needed. The nurse told me it may affect my results, but that I could still have the draw since I was already there!     Just let me know what is needed of me.   Thank you, Janki Snyder

## 2024-02-26 DIAGNOSIS — D72.821 MONOCYTOSIS: Primary | ICD-10-CM

## 2024-02-26 DIAGNOSIS — D72.821 MONOCYTOSIS: ICD-10-CM

## 2024-02-26 PROCEDURE — 36415 COLL VENOUS BLD VENIPUNCTURE: CPT | Performed by: INTERNAL MEDICINE

## 2024-02-27 LAB
BASOPHILS # BLD: 0.1 K/UL (ref 0–0.2)
BASOPHILS NFR BLD: 1.3 %
DEPRECATED RDW RBC AUTO: 12.6 % (ref 12.4–15.4)
EOSINOPHIL # BLD: 0.1 K/UL (ref 0–0.6)
EOSINOPHIL NFR BLD: 2.1 %
HCT VFR BLD AUTO: 43.5 % (ref 36–48)
HGB BLD-MCNC: 14.8 G/DL (ref 12–16)
LYMPHOCYTES # BLD: 1.7 K/UL (ref 1–5.1)
LYMPHOCYTES NFR BLD: 27 %
MCH RBC QN AUTO: 31.5 PG (ref 26–34)
MCHC RBC AUTO-ENTMCNC: 33.9 G/DL (ref 31–36)
MCV RBC AUTO: 92.9 FL (ref 80–100)
MONOCYTES # BLD: 0.4 K/UL (ref 0–1.3)
MONOCYTES NFR BLD: 6.7 %
NEUTROPHILS # BLD: 3.9 K/UL (ref 1.7–7.7)
NEUTROPHILS NFR BLD: 62.9 %
PLATELET # BLD AUTO: 300 K/UL (ref 135–450)
PMV BLD AUTO: 10.5 FL (ref 5–10.5)
RBC # BLD AUTO: 4.69 M/UL (ref 4–5.2)
WBC # BLD AUTO: 6.2 K/UL (ref 4–11)

## 2024-02-27 NOTE — RESULT ENCOUNTER NOTE
Please call pt, her prior shift in white cells w higher monocytes has resolved or normalized.  As long as her flow cytometry follow up study is benign then ok for follow up w us in one yr.  We'll call back when the flow cytometry is completed

## 2024-02-27 NOTE — RESULT ENCOUNTER NOTE
Please call pt, her follow up study, the flow cytometry was also normal so has no abnormal blood cells

## 2024-05-15 ENCOUNTER — OFFICE VISIT (OUTPATIENT)
Dept: INTERNAL MEDICINE CLINIC | Age: 44
End: 2024-05-15
Payer: COMMERCIAL

## 2024-05-15 VITALS
HEART RATE: 74 BPM | WEIGHT: 148.8 LBS | BODY MASS INDEX: 20.83 KG/M2 | HEIGHT: 71 IN | SYSTOLIC BLOOD PRESSURE: 120 MMHG | DIASTOLIC BLOOD PRESSURE: 60 MMHG | OXYGEN SATURATION: 98 %

## 2024-05-15 DIAGNOSIS — J34.0 CELLULITIS OF SIDEWALL OF NOSE: Primary | ICD-10-CM

## 2024-05-15 PROCEDURE — 1036F TOBACCO NON-USER: CPT | Performed by: INTERNAL MEDICINE

## 2024-05-15 PROCEDURE — 99213 OFFICE O/P EST LOW 20 MIN: CPT | Performed by: INTERNAL MEDICINE

## 2024-05-15 PROCEDURE — G8427 DOCREV CUR MEDS BY ELIG CLIN: HCPCS | Performed by: INTERNAL MEDICINE

## 2024-05-15 PROCEDURE — G8420 CALC BMI NORM PARAMETERS: HCPCS | Performed by: INTERNAL MEDICINE

## 2024-05-15 RX ORDER — SULFAMETHOXAZOLE AND TRIMETHOPRIM 800; 160 MG/1; MG/1
1 TABLET ORAL 2 TIMES DAILY
Qty: 20 TABLET | Refills: 0 | Status: SHIPPED | OUTPATIENT
Start: 2024-05-15 | End: 2024-05-25

## 2024-05-15 RX ORDER — PREDNISONE 5 MG/1
TABLET ORAL
Qty: 21 TABLET | Refills: 0 | Status: SHIPPED | OUTPATIENT
Start: 2024-05-15

## 2024-05-15 ASSESSMENT — PATIENT HEALTH QUESTIONNAIRE - PHQ9
SUM OF ALL RESPONSES TO PHQ9 QUESTIONS 1 & 2: 0
SUM OF ALL RESPONSES TO PHQ QUESTIONS 1-9: 0
1. LITTLE INTEREST OR PLEASURE IN DOING THINGS: NOT AT ALL
SUM OF ALL RESPONSES TO PHQ QUESTIONS 1-9: 0
SUM OF ALL RESPONSES TO PHQ9 QUESTIONS 1 & 2: 0
1. LITTLE INTEREST OR PLEASURE IN DOING THINGS: NOT AT ALL
2. FEELING DOWN, DEPRESSED OR HOPELESS: NOT AT ALL
2. FEELING DOWN, DEPRESSED OR HOPELESS: NOT AT ALL

## 2024-05-15 NOTE — PROGRESS NOTES
Janki Snyder  1980  05/15/24    SUBJECTIVE:    Ranjana swelling and tenderness R nostril 3d ago, then worse after tried applying a nasal tissue.  Swelling spreading now the past 24 hrs.no sinus drainage.  No fever, chills    OBJECTIVE:    /60 (Site: Right Upper Arm, Position: Sitting, Cuff Size: Medium Adult)   Pulse 74   Ht 1.803 m (5' 10.98\")   Wt 67.5 kg (148 lb 12.8 oz)   SpO2 98%   BMI 20.76 kg/m²     Physical Exam  HENT:      Nose:        Comments: Swelling w sl erythema, tenderness.          ASSESSMENT:    1. Cellulitis of sidewall of nose        PLAN:    Janki was seen today for other.    Diagnoses and all orders for this visit:    Cellulitis of sidewall of nose -  clinically c/w presentation,  treatment as noted below- To call back one week if not improving.    IF NO IMPROVEMENT NEXT 2-3D THEN ALSO CONSIDER ENT CONSULTATION  -     sulfamethoxazole-trimethoprim (BACTRIM DS;SEPTRA DS) 800-160 MG per tablet; Take 1 tablet by mouth 2 times daily for 10 days  -     mupirocin (BACTROBAN) 2 % ointment; Apply topically 3 times daily.  -     predniSONE (DELTASONE) 5 MG tablet; Take 6 tablets by mouth on day 1, 5 on day 2, 4 on day 3, 3 on day 4, 2 on day 5, 1 on day 6.

## 2025-02-28 ENCOUNTER — HOSPITAL ENCOUNTER (OUTPATIENT)
Age: 45
Discharge: HOME | End: 2025-02-28
Attending: OBSTETRICS & GYNECOLOGY | Admitting: OBSTETRICS & GYNECOLOGY
Payer: COMMERCIAL

## 2025-02-28 VITALS
OXYGEN SATURATION: 99 % | RESPIRATION RATE: 18 BRPM | TEMPERATURE: 98.6 F | HEART RATE: 88 BPM | DIASTOLIC BLOOD PRESSURE: 81 MMHG | SYSTOLIC BLOOD PRESSURE: 137 MMHG

## 2025-02-28 PROBLEM — O09.93 SUPERVISION OF HIGH RISK PREGNANCY IN THIRD TRIMESTER: Status: ACTIVE | Noted: 2025-02-28

## 2025-02-28 PROCEDURE — 59025 FETAL NON-STRESS TEST: CPT

## 2025-02-28 NOTE — FLOWSHEET NOTE
Patient presents to unit for scheduled NST for AMA. Pt shown to LT04 and oriented to room. EFM and toco applied. Abd palpates soft and non-tender. Pt denies vaginal bleeding and LOF and states she is feeling FM. MYLENE Napoles CNM made aware of pt's arrival. Call light within reach.

## 2025-02-28 NOTE — PROGRESS NOTES
[unfilled]    Janki Snyder  1980    No chief complaint on file.       Janki Snyder is a 44 y.o. female who presents today for NST because of  AMA .  The baseline fetal heart rate is 135.  It is category I.  The variability is moderate.  Decelerations are absent.  Accelerations are 15 beats/min or greater.  Interpretation: reactive    Estimated NST time:30m         No Known Allergies        /81   Pulse 88   Temp 98.6 °F (37 °C) (Oral)   Resp 18   SpO2 99%     Physical Exam        ASSESSMENT AND PLAN  NST- AMA  NST- reactive  Will discharge pt home   Follow up in office      LESLY Ferreira CNM

## 2025-02-28 NOTE — FLOWSHEET NOTE
EFM off discharge instructions given explained. Instructed to follow up in office as scheduled and return for decreased FM, leaking fluid, vaginal bleeding or contractions. Information given on fetal kick counts. Pt verbalize understanding and left amb from unit for home without distress.

## 2025-02-28 NOTE — FLOWSHEET NOTE
Pt states she has a low dose BP med (she is unsure of name) prescribed to her that she has not started taking yet.

## 2025-02-28 NOTE — DISCHARGE INSTRUCTIONS
Counting Your Baby's Kicks: Care Instructions  Overview     Counting your baby's kicks is one way your doctor can tell that your baby is healthy. You will probably feel your baby move for the first time between 16 and 22 weeks. The movement may feel like flutters rather than kicks. Your baby may move more at certain times of the day. When you are active, you may notice less kicking than when you are resting. At your prenatal visits, your doctor will ask whether the baby is active.  In your last trimester, your doctor may ask you to count the number of times you feel your baby move.  Follow-up care is a key part of your treatment and safety. Be sure to make and go to all appointments, and call your doctor if you are having problems. It's also a good idea to know your test results and keep a list of the medicines you take.  How do you count fetal kicks?  A common method of checking your baby's movement is to note the length of time it takes to count 10 movements (such as kicks, flutters, or rolls).  Pick your baby's most active time of day to count. This may be any time from morning to evening.  If you don't feel 10 movements in an hour, have something to eat or drink and count for another hour. If you don't feel at least 10 movements in the 2-hour period, call your doctor.  Do not use an at-home Doppler heart monitor in place of counting fetal movements.  When should you call for help?   Call your doctor now or seek immediate medical care if:    You feel fewer than 10 movements in a 2-hour period.     You noticed that your baby has stopped moving or is moving less than normal.   Watch closely for changes in your health, and be sure to contact your doctor if you have any problems.  Where can you learn more?  Go to https://www.[email or web address].net/patientEd and enter U048 to learn more about \"Counting Your Baby's Kicks: Care Instructions.\"  Current as of: April 30, 2024  Content Version: 14.3  © 2024 [facility],

## 2025-03-13 ENCOUNTER — ANESTHESIA EVENT (OUTPATIENT)
Dept: LABOR AND DELIVERY | Age: 45
End: 2025-03-13
Payer: COMMERCIAL

## 2025-03-13 ENCOUNTER — HOSPITAL ENCOUNTER (INPATIENT)
Age: 45
LOS: 4 days | Discharge: HOME | End: 2025-03-18
Attending: OBSTETRICS & GYNECOLOGY | Admitting: OBSTETRICS & GYNECOLOGY
Payer: COMMERCIAL

## 2025-03-13 ENCOUNTER — ANESTHESIA (OUTPATIENT)
Dept: LABOR AND DELIVERY | Age: 45
End: 2025-03-13
Payer: COMMERCIAL

## 2025-03-13 DIAGNOSIS — O14.13 SEVERE PREECLAMPSIA, THIRD TRIMESTER: Primary | ICD-10-CM

## 2025-03-13 PROBLEM — O16.3 HIGH BLOOD PRESSURE AFFECTING PREGNANCY IN THIRD TRIMESTER, ANTEPARTUM: Status: RESOLVED | Noted: 2025-03-13 | Resolved: 2025-03-13

## 2025-03-13 PROBLEM — O16.3 HIGH BLOOD PRESSURE AFFECTING PREGNANCY IN THIRD TRIMESTER, ANTEPARTUM: Status: ACTIVE | Noted: 2025-03-13

## 2025-03-13 PROBLEM — O14.93 PREECLAMPSIA, THIRD TRIMESTER: Status: ACTIVE | Noted: 2025-03-13

## 2025-03-13 LAB
ALBUMIN SERPL-MCNC: 3.4 G/DL (ref 3.4–5)
ALBUMIN/GLOB SERPL: 1.4 {RATIO} (ref 1.1–2.2)
ALP SERPL-CCNC: 144 U/L (ref 40–129)
ALT SERPL-CCNC: 13 U/L (ref 10–40)
ANION GAP SERPL CALCULATED.3IONS-SCNC: 13 MMOL/L (ref 9–17)
AST SERPL-CCNC: 25 U/L (ref 15–37)
BILIRUB SERPL-MCNC: 0.2 MG/DL (ref 0–1)
BUN SERPL-MCNC: 9 MG/DL (ref 7–20)
CALCIUM SERPL-MCNC: 9.2 MG/DL (ref 8.3–10.6)
CHLORIDE SERPL-SCNC: 102 MMOL/L (ref 99–110)
CO2 SERPL-SCNC: 21 MMOL/L (ref 21–32)
CREAT SERPL-MCNC: 0.7 MG/DL (ref 0.6–1.1)
CREAT UR-MCNC: 173 MG/DL (ref 28–217)
ERYTHROCYTE [DISTWIDTH] IN BLOOD BY AUTOMATED COUNT: 11.6 % (ref 11.7–14.9)
GFR, ESTIMATED: >90 ML/MIN/1.73M2
GLUCOSE SERPL-MCNC: 169 MG/DL (ref 74–99)
HCT VFR BLD AUTO: 37.4 % (ref 37–47)
HGB BLD-MCNC: 12.5 G/DL (ref 12.5–16)
MCH RBC QN AUTO: 30.7 PG (ref 27–31)
MCHC RBC AUTO-ENTMCNC: 33.4 G/DL (ref 32–36)
MCV RBC AUTO: 91.9 FL (ref 78–100)
PLATELET # BLD AUTO: 320 K/UL (ref 140–440)
PMV BLD AUTO: 10.8 FL (ref 7.5–11.1)
POTASSIUM SERPL-SCNC: 4 MMOL/L (ref 3.5–5.1)
PROT SERPL-MCNC: 5.8 G/DL (ref 6.4–8.2)
RBC # BLD AUTO: 4.07 M/UL (ref 4.2–5.4)
SODIUM SERPL-SCNC: 136 MMOL/L (ref 136–145)
TOTAL PROTEIN, URINE: 62 MG/DL
TOTAL PROTEIN, URINE: 68 MG/DL
URINE TOTAL PROTEIN CREATININE RATIO: 0.36 (ref 0–0.2)
WBC OTHER # BLD: 10.8 K/UL (ref 4–10.5)

## 2025-03-13 PROCEDURE — 86900 BLOOD TYPING SEROLOGIC ABO: CPT

## 2025-03-13 PROCEDURE — 6360000002 HC RX W HCPCS: Performed by: NURSE ANESTHETIST, CERTIFIED REGISTERED

## 2025-03-13 PROCEDURE — 87653 STREP B DNA AMP PROBE: CPT

## 2025-03-13 PROCEDURE — 82805 BLOOD GASES W/O2 SATURATION: CPT

## 2025-03-13 PROCEDURE — 3609079900 HC CESAREAN SECTION: Performed by: OBSTETRICS & GYNECOLOGY

## 2025-03-13 PROCEDURE — 59514 CESAREAN DELIVERY ONLY: CPT | Performed by: NURSE PRACTITIONER

## 2025-03-13 PROCEDURE — 85027 COMPLETE CBC AUTOMATED: CPT

## 2025-03-13 PROCEDURE — 2580000003 HC RX 258: Performed by: NURSE PRACTITIONER

## 2025-03-13 PROCEDURE — 3700000000 HC ANESTHESIA ATTENDED CARE: Performed by: OBSTETRICS & GYNECOLOGY

## 2025-03-13 PROCEDURE — 6370000000 HC RX 637 (ALT 250 FOR IP): Performed by: OBSTETRICS & GYNECOLOGY

## 2025-03-13 PROCEDURE — 1220000000 HC SEMI PRIVATE OB R&B

## 2025-03-13 PROCEDURE — 88307 TISSUE EXAM BY PATHOLOGIST: CPT

## 2025-03-13 PROCEDURE — 80307 DRUG TEST PRSMV CHEM ANLYZR: CPT

## 2025-03-13 PROCEDURE — 0UT70ZZ RESECTION OF BILATERAL FALLOPIAN TUBES, OPEN APPROACH: ICD-10-PCS | Performed by: OBSTETRICS & GYNECOLOGY

## 2025-03-13 PROCEDURE — 7100000001 HC PACU RECOVERY - ADDTL 15 MIN: Performed by: OBSTETRICS & GYNECOLOGY

## 2025-03-13 PROCEDURE — 6360000002 HC RX W HCPCS

## 2025-03-13 PROCEDURE — 99465 NB RESUSCITATION: CPT

## 2025-03-13 PROCEDURE — 86901 BLOOD TYPING SEROLOGIC RH(D): CPT

## 2025-03-13 PROCEDURE — 82570 ASSAY OF URINE CREATININE: CPT

## 2025-03-13 PROCEDURE — 2500000003 HC RX 250 WO HCPCS: Performed by: NURSE PRACTITIONER

## 2025-03-13 PROCEDURE — C1765 ADHESION BARRIER: HCPCS | Performed by: OBSTETRICS & GYNECOLOGY

## 2025-03-13 PROCEDURE — 6360000002 HC RX W HCPCS: Performed by: NURSE PRACTITIONER

## 2025-03-13 PROCEDURE — 6370000000 HC RX 637 (ALT 250 FOR IP): Performed by: NURSE PRACTITIONER

## 2025-03-13 PROCEDURE — 2709999900 HC NON-CHARGEABLE SUPPLY: Performed by: OBSTETRICS & GYNECOLOGY

## 2025-03-13 PROCEDURE — 3700000001 HC ADD 15 MINUTES (ANESTHESIA): Performed by: OBSTETRICS & GYNECOLOGY

## 2025-03-13 PROCEDURE — 94761 N-INVAS EAR/PLS OXIMETRY MLT: CPT

## 2025-03-13 PROCEDURE — APPNB30 APP NON BILLABLE TIME 0-30 MINS: Performed by: NURSE PRACTITIONER

## 2025-03-13 PROCEDURE — 2500000003 HC RX 250 WO HCPCS: Performed by: NURSE ANESTHETIST, CERTIFIED REGISTERED

## 2025-03-13 PROCEDURE — 2700000000 HC OXYGEN THERAPY PER DAY

## 2025-03-13 PROCEDURE — 86920 COMPATIBILITY TEST SPIN: CPT

## 2025-03-13 PROCEDURE — 88302 TISSUE EXAM BY PATHOLOGIST: CPT

## 2025-03-13 PROCEDURE — 80053 COMPREHEN METABOLIC PANEL: CPT

## 2025-03-13 PROCEDURE — 86850 RBC ANTIBODY SCREEN: CPT

## 2025-03-13 PROCEDURE — 84156 ASSAY OF PROTEIN URINE: CPT

## 2025-03-13 PROCEDURE — 7100000000 HC PACU RECOVERY - FIRST 15 MIN: Performed by: OBSTETRICS & GYNECOLOGY

## 2025-03-13 PROCEDURE — 6370000000 HC RX 637 (ALT 250 FOR IP)

## 2025-03-13 PROCEDURE — 2580000003 HC RX 258: Performed by: OBSTETRICS & GYNECOLOGY

## 2025-03-13 RX ORDER — NALBUPHINE HYDROCHLORIDE 10 MG/ML
5 INJECTION INTRAMUSCULAR; INTRAVENOUS; SUBCUTANEOUS EVERY 4 HOURS PRN
Status: ACTIVE | OUTPATIENT
Start: 2025-03-13 | End: 2025-03-14

## 2025-03-13 RX ORDER — ONDANSETRON 2 MG/ML
4 INJECTION INTRAMUSCULAR; INTRAVENOUS EVERY 6 HOURS PRN
Status: DISCONTINUED | OUTPATIENT
Start: 2025-03-13 | End: 2025-03-13

## 2025-03-13 RX ORDER — KETOROLAC TROMETHAMINE 30 MG/ML
30 INJECTION, SOLUTION INTRAMUSCULAR; INTRAVENOUS EVERY 6 HOURS
Status: DISPENSED | OUTPATIENT
Start: 2025-03-13 | End: 2025-03-14

## 2025-03-13 RX ORDER — SODIUM CHLORIDE 0.9 % (FLUSH) 0.9 %
5-40 SYRINGE (ML) INJECTION EVERY 12 HOURS SCHEDULED
Status: DISCONTINUED | OUTPATIENT
Start: 2025-03-13 | End: 2025-03-18 | Stop reason: HOSPADM

## 2025-03-13 RX ORDER — DEXMEDETOMIDINE HYDROCHLORIDE 100 UG/ML
INJECTION, SOLUTION INTRAVENOUS
Status: DISCONTINUED | OUTPATIENT
Start: 2025-03-13 | End: 2025-03-13 | Stop reason: SDUPTHER

## 2025-03-13 RX ORDER — CALCIUM GLUCONATE 94 MG/ML
1000 INJECTION, SOLUTION INTRAVENOUS PRN
Status: DISCONTINUED | OUTPATIENT
Start: 2025-03-13 | End: 2025-03-13

## 2025-03-13 RX ORDER — SODIUM CHLORIDE 0.9 % (FLUSH) 0.9 %
5-40 SYRINGE (ML) INJECTION EVERY 12 HOURS SCHEDULED
Status: DISCONTINUED | OUTPATIENT
Start: 2025-03-13 | End: 2025-03-13

## 2025-03-13 RX ORDER — MAGNESIUM SULFATE 4 G/50ML
4000 INJECTION INTRAVENOUS ONCE
Status: COMPLETED | OUTPATIENT
Start: 2025-03-13 | End: 2025-03-13

## 2025-03-13 RX ORDER — SODIUM CHLORIDE 0.9 % (FLUSH) 0.9 %
10 SYRINGE (ML) INJECTION PRN
Status: DISCONTINUED | OUTPATIENT
Start: 2025-03-13 | End: 2025-03-13

## 2025-03-13 RX ORDER — MAGNESIUM SULFATE 4 G/50ML
INJECTION INTRAVENOUS
Status: COMPLETED
Start: 2025-03-13 | End: 2025-03-13

## 2025-03-13 RX ORDER — SODIUM CHLORIDE, SODIUM LACTATE, POTASSIUM CHLORIDE, AND CALCIUM CHLORIDE .6; .31; .03; .02 G/100ML; G/100ML; G/100ML; G/100ML
1000 INJECTION, SOLUTION INTRAVENOUS ONCE
Status: DISCONTINUED | OUTPATIENT
Start: 2025-03-13 | End: 2025-03-13

## 2025-03-13 RX ORDER — CARBOPROST TROMETHAMINE 250 UG/ML
250 INJECTION, SOLUTION INTRAMUSCULAR PRN
Status: DISCONTINUED | OUTPATIENT
Start: 2025-03-13 | End: 2025-03-13

## 2025-03-13 RX ORDER — SODIUM CHLORIDE, SODIUM LACTATE, POTASSIUM CHLORIDE, AND CALCIUM CHLORIDE .6; .31; .03; .02 G/100ML; G/100ML; G/100ML; G/100ML
1000 INJECTION, SOLUTION INTRAVENOUS PRN
Status: DISCONTINUED | OUTPATIENT
Start: 2025-03-13 | End: 2025-03-13

## 2025-03-13 RX ORDER — CITRIC ACID/SODIUM CITRATE 334-500MG
30 SOLUTION, ORAL ORAL ONCE
Status: COMPLETED | OUTPATIENT
Start: 2025-03-13 | End: 2025-03-13

## 2025-03-13 RX ORDER — SODIUM CHLORIDE, SODIUM LACTATE, POTASSIUM CHLORIDE, CALCIUM CHLORIDE 600; 310; 30; 20 MG/100ML; MG/100ML; MG/100ML; MG/100ML
INJECTION, SOLUTION INTRAVENOUS CONTINUOUS
Status: DISCONTINUED | OUTPATIENT
Start: 2025-03-13 | End: 2025-03-13

## 2025-03-13 RX ORDER — ACETAMINOPHEN 500 MG
1000 TABLET ORAL EVERY 8 HOURS SCHEDULED
Status: DISCONTINUED | OUTPATIENT
Start: 2025-03-13 | End: 2025-03-18 | Stop reason: HOSPADM

## 2025-03-13 RX ORDER — FAMOTIDINE 10 MG/ML
20 INJECTION, SOLUTION INTRAVENOUS 2 TIMES DAILY PRN
Status: DISCONTINUED | OUTPATIENT
Start: 2025-03-13 | End: 2025-03-13

## 2025-03-13 RX ORDER — ONDANSETRON 4 MG/1
4 TABLET, ORALLY DISINTEGRATING ORAL EVERY 6 HOURS PRN
Status: DISCONTINUED | OUTPATIENT
Start: 2025-03-13 | End: 2025-03-13

## 2025-03-13 RX ORDER — ONDANSETRON 2 MG/ML
4 INJECTION INTRAMUSCULAR; INTRAVENOUS EVERY 6 HOURS PRN
Status: DISCONTINUED | OUTPATIENT
Start: 2025-03-13 | End: 2025-03-13 | Stop reason: SDUPTHER

## 2025-03-13 RX ORDER — DEXAMETHASONE SODIUM PHOSPHATE 4 MG/ML
INJECTION, SOLUTION INTRA-ARTICULAR; INTRALESIONAL; INTRAMUSCULAR; INTRAVENOUS; SOFT TISSUE
Status: DISCONTINUED | OUTPATIENT
Start: 2025-03-13 | End: 2025-03-13 | Stop reason: SDUPTHER

## 2025-03-13 RX ORDER — LABETALOL HYDROCHLORIDE 5 MG/ML
40 INJECTION, SOLUTION INTRAVENOUS
Status: DISCONTINUED | OUTPATIENT
Start: 2025-03-13 | End: 2025-03-13

## 2025-03-13 RX ORDER — EPHEDRINE SULFATE 50 MG/ML
INJECTION INTRAVENOUS
Status: DISCONTINUED | OUTPATIENT
Start: 2025-03-13 | End: 2025-03-13 | Stop reason: SDUPTHER

## 2025-03-13 RX ORDER — LIDOCAINE HYDROCHLORIDE 10 MG/ML
30 INJECTION, SOLUTION EPIDURAL; INFILTRATION; INTRACAUDAL; PERINEURAL PRN
Status: DISCONTINUED | OUTPATIENT
Start: 2025-03-13 | End: 2025-03-13

## 2025-03-13 RX ORDER — LIDOCAINE HYDROCHLORIDE 10 MG/ML
INJECTION, SOLUTION INFILTRATION; PERINEURAL
Status: DISCONTINUED | OUTPATIENT
Start: 2025-03-13 | End: 2025-03-13 | Stop reason: SDUPTHER

## 2025-03-13 RX ORDER — BETAMETHASONE SODIUM PHOSPHATE AND BETAMETHASONE ACETATE 3; 3 MG/ML; MG/ML
12 INJECTION, SUSPENSION INTRA-ARTICULAR; INTRALESIONAL; INTRAMUSCULAR; SOFT TISSUE ONCE
Status: COMPLETED | OUTPATIENT
Start: 2025-03-13 | End: 2025-03-13

## 2025-03-13 RX ORDER — MISOPROSTOL 200 UG/1
400 TABLET ORAL PRN
Status: DISCONTINUED | OUTPATIENT
Start: 2025-03-13 | End: 2025-03-13

## 2025-03-13 RX ORDER — MORPHINE SULFATE 0.5 MG/ML
INJECTION, SOLUTION EPIDURAL; INTRATHECAL; INTRAVENOUS
Status: DISCONTINUED | OUTPATIENT
Start: 2025-03-13 | End: 2025-03-13 | Stop reason: SDUPTHER

## 2025-03-13 RX ORDER — ENOXAPARIN SODIUM 100 MG/ML
40 INJECTION SUBCUTANEOUS DAILY
Status: DISCONTINUED | OUTPATIENT
Start: 2025-03-14 | End: 2025-03-18 | Stop reason: HOSPADM

## 2025-03-13 RX ORDER — SODIUM CHLORIDE 0.9 % (FLUSH) 0.9 %
10 SYRINGE (ML) INJECTION EVERY 12 HOURS SCHEDULED
Status: DISCONTINUED | OUTPATIENT
Start: 2025-03-13 | End: 2025-03-13

## 2025-03-13 RX ORDER — HYDRALAZINE HYDROCHLORIDE 20 MG/ML
10 INJECTION INTRAMUSCULAR; INTRAVENOUS
Status: DISCONTINUED | OUTPATIENT
Start: 2025-03-13 | End: 2025-03-13

## 2025-03-13 RX ORDER — SWAB
1 SWAB, NON-MEDICATED MISCELLANEOUS DAILY
Status: DISCONTINUED | OUTPATIENT
Start: 2025-03-14 | End: 2025-03-18 | Stop reason: HOSPADM

## 2025-03-13 RX ORDER — IBUPROFEN 800 MG/1
800 TABLET, FILM COATED ORAL EVERY 8 HOURS
Status: DISCONTINUED | OUTPATIENT
Start: 2025-03-14 | End: 2025-03-18 | Stop reason: HOSPADM

## 2025-03-13 RX ORDER — SODIUM CHLORIDE, SODIUM LACTATE, POTASSIUM CHLORIDE, AND CALCIUM CHLORIDE .6; .31; .03; .02 G/100ML; G/100ML; G/100ML; G/100ML
500 INJECTION, SOLUTION INTRAVENOUS PRN
Status: DISCONTINUED | OUTPATIENT
Start: 2025-03-13 | End: 2025-03-13

## 2025-03-13 RX ORDER — SODIUM CHLORIDE 0.9 % (FLUSH) 0.9 %
5-40 SYRINGE (ML) INJECTION PRN
Status: DISCONTINUED | OUTPATIENT
Start: 2025-03-13 | End: 2025-03-13

## 2025-03-13 RX ORDER — LABETALOL HYDROCHLORIDE 5 MG/ML
20 INJECTION, SOLUTION INTRAVENOUS
Status: COMPLETED | OUTPATIENT
Start: 2025-03-13 | End: 2025-03-13

## 2025-03-13 RX ORDER — DOCUSATE SODIUM 100 MG/1
100 CAPSULE, LIQUID FILLED ORAL 2 TIMES DAILY
Status: DISCONTINUED | OUTPATIENT
Start: 2025-03-13 | End: 2025-03-18 | Stop reason: HOSPADM

## 2025-03-13 RX ORDER — ACETAMINOPHEN 325 MG/1
975 TABLET ORAL ONCE
Status: COMPLETED | OUTPATIENT
Start: 2025-03-13 | End: 2025-03-13

## 2025-03-13 RX ORDER — BUPIVACAINE HYDROCHLORIDE 7.5 MG/ML
INJECTION, SOLUTION INTRASPINAL
Status: DISCONTINUED | OUTPATIENT
Start: 2025-03-13 | End: 2025-03-13 | Stop reason: SDUPTHER

## 2025-03-13 RX ORDER — TRANEXAMIC ACID 10 MG/ML
1000 INJECTION, SOLUTION INTRAVENOUS
Status: DISCONTINUED | OUTPATIENT
Start: 2025-03-13 | End: 2025-03-13

## 2025-03-13 RX ORDER — LABETALOL HYDROCHLORIDE 5 MG/ML
80 INJECTION, SOLUTION INTRAVENOUS
Status: DISCONTINUED | OUTPATIENT
Start: 2025-03-13 | End: 2025-03-13

## 2025-03-13 RX ORDER — TERBUTALINE SULFATE 1 MG/ML
0.25 INJECTION SUBCUTANEOUS
Status: COMPLETED | OUTPATIENT
Start: 2025-03-13 | End: 2025-03-13

## 2025-03-13 RX ORDER — ONDANSETRON 4 MG/1
4 TABLET, ORALLY DISINTEGRATING ORAL EVERY 8 HOURS PRN
Status: DISCONTINUED | OUTPATIENT
Start: 2025-03-13 | End: 2025-03-18 | Stop reason: HOSPADM

## 2025-03-13 RX ORDER — FENTANYL CITRATE 50 UG/ML
100 INJECTION, SOLUTION INTRAMUSCULAR; INTRAVENOUS
Status: DISCONTINUED | OUTPATIENT
Start: 2025-03-13 | End: 2025-03-13

## 2025-03-13 RX ORDER — SODIUM CHLORIDE 9 MG/ML
INJECTION, SOLUTION INTRAVENOUS PRN
Status: DISCONTINUED | OUTPATIENT
Start: 2025-03-13 | End: 2025-03-13 | Stop reason: SDUPTHER

## 2025-03-13 RX ORDER — KETOROLAC TROMETHAMINE 30 MG/ML
INJECTION, SOLUTION INTRAMUSCULAR; INTRAVENOUS
Status: DISCONTINUED | OUTPATIENT
Start: 2025-03-13 | End: 2025-03-13 | Stop reason: SDUPTHER

## 2025-03-13 RX ORDER — SODIUM CHLORIDE 9 MG/ML
INJECTION, SOLUTION INTRAVENOUS PRN
Status: DISCONTINUED | OUTPATIENT
Start: 2025-03-13 | End: 2025-03-13

## 2025-03-13 RX ORDER — SODIUM CHLORIDE 0.9 % (FLUSH) 0.9 %
5-40 SYRINGE (ML) INJECTION PRN
Status: DISCONTINUED | OUTPATIENT
Start: 2025-03-13 | End: 2025-03-18 | Stop reason: HOSPADM

## 2025-03-13 RX ORDER — METHYLERGONOVINE MALEATE 0.2 MG/ML
200 INJECTION INTRAVENOUS PRN
Status: DISCONTINUED | OUTPATIENT
Start: 2025-03-13 | End: 2025-03-13

## 2025-03-13 RX ORDER — SODIUM CHLORIDE 9 MG/ML
INJECTION, SOLUTION INTRAVENOUS PRN
Status: DISCONTINUED | OUTPATIENT
Start: 2025-03-13 | End: 2025-03-18 | Stop reason: HOSPADM

## 2025-03-13 RX ORDER — LANOLIN ALCOHOL/MO/W.PET/CERES
400 CREAM (GRAM) TOPICAL DAILY
Status: ON HOLD | COMMUNITY
End: 2025-03-18 | Stop reason: HOSPADM

## 2025-03-13 RX ORDER — NALOXONE HYDROCHLORIDE 0.4 MG/ML
INJECTION, SOLUTION INTRAMUSCULAR; INTRAVENOUS; SUBCUTANEOUS PRN
Status: ACTIVE | OUTPATIENT
Start: 2025-03-13 | End: 2025-03-14

## 2025-03-13 RX ORDER — ONDANSETRON 4 MG/1
4 TABLET, ORALLY DISINTEGRATING ORAL EVERY 6 HOURS PRN
Status: DISCONTINUED | OUTPATIENT
Start: 2025-03-13 | End: 2025-03-13 | Stop reason: SDUPTHER

## 2025-03-13 RX ORDER — FAMOTIDINE 20 MG/1
20 TABLET, FILM COATED ORAL 2 TIMES DAILY PRN
Status: DISCONTINUED | OUTPATIENT
Start: 2025-03-13 | End: 2025-03-18 | Stop reason: HOSPADM

## 2025-03-13 RX ORDER — FAMOTIDINE 10 MG/ML
20 INJECTION, SOLUTION INTRAVENOUS ONCE
Status: DISCONTINUED | OUTPATIENT
Start: 2025-03-13 | End: 2025-03-13

## 2025-03-13 RX ORDER — PRENATAL VIT NO.126/IRON/FOLIC 28MG-0.8MG
1 TABLET ORAL DAILY
Status: ON HOLD | COMMUNITY
Start: 2025-01-17 | End: 2025-03-18 | Stop reason: HOSPADM

## 2025-03-13 RX ORDER — ONDANSETRON 2 MG/ML
4 INJECTION INTRAMUSCULAR; INTRAVENOUS EVERY 6 HOURS PRN
Status: DISCONTINUED | OUTPATIENT
Start: 2025-03-13 | End: 2025-03-18 | Stop reason: HOSPADM

## 2025-03-13 RX ADMIN — EPHEDRINE SULFATE 10 MG: 50 INJECTION INTRAVENOUS at 17:43

## 2025-03-13 RX ADMIN — TERBUTALINE SULFATE 0.25 MG: 1 INJECTION, SOLUTION SUBCUTANEOUS at 16:08

## 2025-03-13 RX ADMIN — Medication 87.3 MILLI-UNITS/MIN: at 17:37

## 2025-03-13 RX ADMIN — CEFAZOLIN 2000 MG: 2 INJECTION, POWDER, FOR SOLUTION INTRAMUSCULAR; INTRAVENOUS at 17:04

## 2025-03-13 RX ADMIN — ACETAMINOPHEN 975 MG: 325 TABLET ORAL at 17:03

## 2025-03-13 RX ADMIN — KETOROLAC TROMETHAMINE 30 MG: 30 INJECTION, SOLUTION INTRAMUSCULAR; INTRAVENOUS at 17:28

## 2025-03-13 RX ADMIN — AMPICILLIN SODIUM 2000 MG: 2 INJECTION, POWDER, FOR SOLUTION INTRAVENOUS at 12:15

## 2025-03-13 RX ADMIN — Medication 25 MCG: at 12:12

## 2025-03-13 RX ADMIN — SODIUM CHLORIDE, SODIUM LACTATE, POTASSIUM CHLORIDE, AND CALCIUM CHLORIDE: .6; .31; .03; .02 INJECTION, SOLUTION INTRAVENOUS at 10:57

## 2025-03-13 RX ADMIN — MORPHINE SULFATE 0.15 MG: 0.5 INJECTION, SOLUTION EPIDURAL; INTRATHECAL; INTRAVENOUS at 17:17

## 2025-03-13 RX ADMIN — LIDOCAINE HYDROCHLORIDE 3 ML: 10 INJECTION, SOLUTION INFILTRATION; PERINEURAL at 17:16

## 2025-03-13 RX ADMIN — EPHEDRINE SULFATE 10 MG: 50 INJECTION INTRAVENOUS at 17:19

## 2025-03-13 RX ADMIN — BUPIVACAINE HYDROCHLORIDE IN DEXTROSE 1.7 ML: 7.5 INJECTION, SOLUTION SUBARACHNOID at 17:17

## 2025-03-13 RX ADMIN — EPHEDRINE SULFATE 15 MG: 50 INJECTION INTRAVENOUS at 17:23

## 2025-03-13 RX ADMIN — EPHEDRINE SULFATE 15 MG: 50 INJECTION INTRAVENOUS at 18:01

## 2025-03-13 RX ADMIN — DEXMEDETOMIDINE 5 MCG: 100 INJECTION, SOLUTION INTRAVENOUS at 17:17

## 2025-03-13 RX ADMIN — MAGNESIUM SULFATE HEPTAHYDRATE 4000 MG: 80 INJECTION, SOLUTION INTRAVENOUS at 11:01

## 2025-03-13 RX ADMIN — ONDANSETRON 4 MG: 2 INJECTION INTRAMUSCULAR; INTRAVENOUS at 17:02

## 2025-03-13 RX ADMIN — PHENYLEPHRINE HYDROCHLORIDE 100 MCG: 10 INJECTION INTRAVENOUS at 17:21

## 2025-03-13 RX ADMIN — SODIUM CHLORIDE, SODIUM LACTATE, POTASSIUM CHLORIDE, AND CALCIUM CHLORIDE: .6; .31; .03; .02 INJECTION, SOLUTION INTRAVENOUS at 16:57

## 2025-03-13 RX ADMIN — MORPHINE SULFATE 4.85 MG: 0.5 INJECTION, SOLUTION EPIDURAL; INTRATHECAL; INTRAVENOUS at 17:30

## 2025-03-13 RX ADMIN — Medication 166.7 ML: at 17:26

## 2025-03-13 RX ADMIN — FAMOTIDINE 20 MG: 10 INJECTION, SOLUTION INTRAVENOUS at 13:54

## 2025-03-13 RX ADMIN — BETAMETHASONE SODIUM PHOSPHATE AND BETAMETHASONE ACETATE 12 MG: 3; 3 INJECTION, SUSPENSION INTRA-ARTICULAR; INTRALESIONAL; INTRAMUSCULAR at 11:24

## 2025-03-13 RX ADMIN — LABETALOL HYDROCHLORIDE 20 MG: 5 INJECTION, SOLUTION INTRAVENOUS at 10:58

## 2025-03-13 RX ADMIN — MAGNESIUM SULFATE 4000 MG: 4 INJECTION INTRAVENOUS at 11:01

## 2025-03-13 RX ADMIN — SODIUM CITRATE AND CITRIC ACID MONOHYDRATE 30 ML: 500; 334 SOLUTION ORAL at 17:04

## 2025-03-13 RX ADMIN — PHENYLEPHRINE HYDROCHLORIDE 100 MCG: 10 INJECTION INTRAVENOUS at 17:18

## 2025-03-13 RX ADMIN — DEXAMETHASONE SODIUM PHOSPHATE 8 MG: 4 INJECTION, SOLUTION INTRAMUSCULAR; INTRAVENOUS at 17:28

## 2025-03-13 RX ADMIN — MAGNESIUM SULFATE HEPTAHYDRATE 2000 MG/HR: 40 INJECTION, SOLUTION INTRAVENOUS at 11:21

## 2025-03-13 ASSESSMENT — LIFESTYLE VARIABLES: SMOKING_STATUS: 0

## 2025-03-13 NOTE — FLOWSHEET NOTE
EFM and TOCO applied.     Patient reports fetal movement, denies loss of fluids, reports vaginal bleeding starting this morning, and reports some abdominal cramping that she noticed last night. Patient's abdomen is soft and non tender at rest.

## 2025-03-13 NOTE — H&P
HIV- Neg  Hep B- Neg   GC/C NEG         General appearance:  awake, alert, cooperative, no apparent distress, and appears stated age  Neurologic:  Awake, alert, oriented to name, place and time.    Lungs:  No increased work of breathing, good air exchange  Abdomen:  Soft, non tender, gravid, consistent with her gestational age,   Fetal heart rate:    Baseline Heart Rate:  150        Accelerations:  present       Long Term Variability:  moderate       Decelerations:  absent       Pelvis:  Adequate pelvis  Cervix: 1 cm Long medium -2      Contraction frequency:  7 minutes    Membranes:  Intact    ASSESSMENT AND PLAN:    Labor: Admit, anticipate normal delivery, routine labor orders  Fetus: Reassuring  GBS: unknown , will treat and send swab   Other: MAG Sulfate and IV Anti - Hypertensives per protocol   Betamethasone X 1 dose now and in 24 hours if still pregnant  Start IOL         I have collaborated and updated Dr. Lopes   and the MD agrees with the current POC.    LESLY Alamo CNM

## 2025-03-13 NOTE — FLOWSHEET NOTE
TR to Dr. Lopes notified of BP orders to start Magnesium Sulfate back at 1gm/hr.  POC discussed with patient.

## 2025-03-13 NOTE — ANESTHESIA PROCEDURE NOTES
Spinal Block    Patient location during procedure: OB  End time: 3/13/2025 5:17 PM  Reason for block: primary anesthetic  Staffing  Performed: resident/CRNA   Resident/CRNA: Vivien Sierra APRN - CRNA  Performed by: Vivien Sierra APRN - CRNA  Authorized by: Jam Brizuela MD    Spinal Block  Patient position: sitting  Prep: ChloraPrep  Patient monitoring: frequent blood pressure checks and continuous pulse ox  Approach: midline  Location: L3/L4  Provider prep: mask and sterile gloves  Local infiltration: lidocaine  Needle  Needle type: Pencan   Needle gauge: 25 G  Needle length: 3.5 in  Assessment  Sensory level: T4  Swirl obtained: Yes  CSF: clear  Attempts: 1  Hemodynamics: stable  Preanesthetic Checklist  Completed: patient identified, IV checked, site marked, risks and benefits discussed, surgical/procedural consents, equipment checked, pre-op evaluation, timeout performed, anesthesia consent given, oxygen available, monitors applied/VS acknowledged, fire risk safety assessment completed and verbalized and blood product R/B/A discussed and consented

## 2025-03-13 NOTE — FLOWSHEET NOTE
Patient arrives ambulatory to the birthing center with complaints of vaginal bleeding. Patient escorted to TRI04, instructed to change into gown, obtain a urine specimen, and oriented to room and call light.

## 2025-03-13 NOTE — OP NOTE
PREOPERATIVE DIAGNOSES:  1.GP@ 35w1d                                                             2. Preeclampsia with severe features  3. Non-reassuring FHT  4. Suspected placental abruption  5. Fetal bradycardia after spinal     POSTOPERATIVE DIAGNOSES:  Same      PROCEDURE:     Emergent low transverse  section.       Bilateral salpingectomy      SURGEON:     Floar Lopes M.D.    ASSISTANT    Su Russell CNM was present for the entire case to facilitate a safe and efficacious delivery of the infant      ESTIMATED BLOOD LOSS:   800 mL      COMPLICATIONS:     None.         ANESTHESIA:    Spinal.         FINDINGS:   Live male         Infant Apgars 5 and 8 at 1 and 5 min respectively.      Weight: not yet weighed      Normal tubes and ovaries bilaterally.         DETAILS OF PROCEDURE:   Patient was taken to the operating room and was spinal anesthesia was placed. At this time FHT were checked and noted to be in the 70s. The decision was made to proceed with an emergent  section. A time out was documented. She was then prepped and draped in the usual manner. Anesthesia level was checked and was found to be adequate. The abdomen was entered thru a pfannensteil incision and carried down sharply to the fascia. The fascia was entered in the same plane as the skin incision and  from the underlying rectus abdominis muscles which were  in the midline exposing the parietal peritoneum. The peritoneum was opened sharply in a longitudinal fashion. A bladder retractor was placed in position and the visceral peritoneum overlying the lower uterine segment was opened transversely and a bladder flap was developed in a sharp manner. The lower uterine segment was opened in a low transverse fashion. The amniotic cavity was entered and Clear amniotic fluid was suctioned out. The baby was then delivered from the Cephalicpresentation .Cord was clamped and cut and the baby was handed over to the

## 2025-03-13 NOTE — ANESTHESIA PRE PROCEDURE
Department of Anesthesiology  Preprocedure Note       Name:  Janki Snyder   Age:  44 y.o.  :  1980                                          MRN:  [id number]         Date:  3/13/2025      Surgeon: * No surgeons listed *    Procedure: * No procedures listed *    Medications prior to admission:   Prior to Admission medications    Medication Sig Start Date End Date Taking? Authorizing Provider   Prenatal Vit-Fe Fumarate-FA (CLASSIC PRENATAL) 28-0.8 MG TABS Take 1 tablet by mouth daily 25  Yes ProviderJada MD   magnesium oxide (MAG-OX) 400 (240 Mg) MG tablet Take 1 tablet by mouth daily   Yes ProviderJada MD       Current medications:    Current Facility-Administered Medications   Medication Dose Route Frequency Provider Last Rate Last Admin   • 0.9 % sodium chloride infusion   IntraVENous PRN Su Russell APRN - CNM       • labetalol (NORMODYNE;TRANDATE) injection 40 mg  40 mg IntraVENous Once PRN Su Russell APRN - CNM       • labetalol (NORMODYNE;TRANDATE) injection 80 mg  80 mg IntraVENous Once PRN Su Russell APRN - CNM       • hydrALAZINE (APRESOLINE) injection 10 mg  10 mg IntraVENous Once PRN Su Russell APRN - CNM       • lactated ringers infusion   IntraVENous Continuous Flora Lopes MD 75 mL/hr at 25 1136 Rate Verify at 25 1136   • lactated ringers bolus 500 mL  500 mL IntraVENous PRN Su Russell APRN - CNM        Or   • lactated ringers bolus 1,000 mL  1,000 mL IntraVENous PRN Su Russell APRN - CNM       • sodium chloride flush 0.9 % injection 5-40 mL  5-40 mL IntraVENous 2 times per day Su Russell APRN - CNM       • sodium chloride flush 0.9 % injection 5-40 mL  5-40 mL IntraVENous PRN Su Russell APRN - CNM       • 0.9 % sodium chloride infusion   IntraVENous PRN Su Russell APRN - CNM       • oxytocin (PITOCIN) 30 units in 500 mL infusion  1-20 analia-units/min IntraVENous Continuous

## 2025-03-14 PROBLEM — O14.13 SEVERE PREECLAMPSIA, THIRD TRIMESTER: Status: ACTIVE | Noted: 2025-03-14

## 2025-03-14 LAB
ALBUMIN SERPL-MCNC: 2.7 G/DL (ref 3.4–5)
ALBUMIN/GLOB SERPL: 1.4 {RATIO} (ref 1.1–2.2)
ALP SERPL-CCNC: 118 U/L (ref 40–129)
ALT SERPL-CCNC: 11 U/L (ref 10–40)
AMPHET UR QL SCN: NEGATIVE
ANION GAP SERPL CALCULATED.3IONS-SCNC: 11 MMOL/L (ref 9–17)
AST SERPL-CCNC: 27 U/L (ref 15–37)
BARBITURATES UR QL SCN: NEGATIVE
BASOPHILS # BLD: 0.03 K/UL
BASOPHILS NFR BLD: 0 % (ref 0–1)
BENZODIAZ UR QL: NEGATIVE
BILIRUB SERPL-MCNC: 0.2 MG/DL (ref 0–1)
BUN SERPL-MCNC: 10 MG/DL (ref 7–20)
CALCIUM SERPL-MCNC: 8 MG/DL (ref 8.3–10.6)
CANNABINOIDS UR QL SCN: NEGATIVE
CHLORIDE SERPL-SCNC: 101 MMOL/L (ref 99–110)
CO2 SERPL-SCNC: 22 MMOL/L (ref 21–32)
COCAINE UR QL SCN: NEGATIVE
CREAT SERPL-MCNC: 0.6 MG/DL (ref 0.6–1.1)
EKG ATRIAL RATE: 81 BPM
EKG DIAGNOSIS: NORMAL
EKG P AXIS: 32 DEGREES
EKG P-R INTERVAL: 140 MS
EKG Q-T INTERVAL: 374 MS
EKG QRS DURATION: 90 MS
EKG QTC CALCULATION (BAZETT): 434 MS
EKG R AXIS: 78 DEGREES
EKG T AXIS: 46 DEGREES
EKG VENTRICULAR RATE: 81 BPM
EOSINOPHIL # BLD: 0 K/UL
EOSINOPHILS RELATIVE PERCENT: 0 % (ref 0–3)
ERYTHROCYTE [DISTWIDTH] IN BLOOD BY AUTOMATED COUNT: 12 % (ref 11.7–14.9)
ERYTHROCYTE [DISTWIDTH] IN BLOOD BY AUTOMATED COUNT: 12 % (ref 11.7–14.9)
FENTANYL UR QL: NEGATIVE
GFR, ESTIMATED: >90 ML/MIN/1.73M2
GLUCOSE BLD-MCNC: 112 MG/DL (ref 74–99)
GLUCOSE BLD-MCNC: 94 MG/DL (ref 74–99)
GLUCOSE SERPL-MCNC: 104 MG/DL (ref 74–99)
GROUP B STREP SCREEN PCR: NOT DETECTED
HCT VFR BLD AUTO: 24.2 % (ref 37–47)
HCT VFR BLD AUTO: 25.2 % (ref 37–47)
HGB BLD-MCNC: 8.3 G/DL (ref 12.5–16)
HGB BLD-MCNC: 8.6 G/DL (ref 12.5–16)
IMM GRANULOCYTES # BLD AUTO: 0.13 K/UL
IMM GRANULOCYTES NFR BLD: 1 %
LYMPHOCYTES NFR BLD: 1.33 K/UL
LYMPHOCYTES RELATIVE PERCENT: 7 % (ref 24–44)
MAGNESIUM SERPL-MCNC: 4 MG/DL (ref 1.8–2.4)
MCH RBC QN AUTO: 31.6 PG (ref 27–31)
MCH RBC QN AUTO: 31.7 PG (ref 27–31)
MCHC RBC AUTO-ENTMCNC: 34.1 G/DL (ref 32–36)
MCHC RBC AUTO-ENTMCNC: 34.3 G/DL (ref 32–36)
MCV RBC AUTO: 92 FL (ref 78–100)
MCV RBC AUTO: 93 FL (ref 78–100)
MONOCYTES NFR BLD: 1.23 K/UL
MONOCYTES NFR BLD: 6 % (ref 0–4)
NEUTROPHILS NFR BLD: 87 % (ref 36–66)
NEUTS SEG NFR BLD: 17.29 K/UL
OPIATES UR QL SCN: NEGATIVE
OXYCODONE UR QL SCN: NEGATIVE
PLATELET # BLD AUTO: 258 K/UL (ref 140–440)
PLATELET # BLD AUTO: 320 K/UL (ref 140–440)
PMV BLD AUTO: 11.2 FL (ref 7.5–11.1)
PMV BLD AUTO: 11.2 FL (ref 7.5–11.1)
POTASSIUM SERPL-SCNC: 4.4 MMOL/L (ref 3.5–5.1)
PROT SERPL-MCNC: 4.7 G/DL (ref 6.4–8.2)
RBC # BLD AUTO: 2.63 M/UL (ref 4.2–5.4)
RBC # BLD AUTO: 2.71 M/UL (ref 4.2–5.4)
SODIUM SERPL-SCNC: 134 MMOL/L (ref 136–145)
SOURCE, 60200063: NORMAL
TEST INFORMATION: NORMAL
WBC OTHER # BLD: 20 K/UL (ref 4–10.5)
WBC OTHER # BLD: 21.1 K/UL (ref 4–10.5)

## 2025-03-14 PROCEDURE — 6360000002 HC RX W HCPCS: Performed by: OBSTETRICS & GYNECOLOGY

## 2025-03-14 PROCEDURE — 6370000000 HC RX 637 (ALT 250 FOR IP): Performed by: OBSTETRICS & GYNECOLOGY

## 2025-03-14 PROCEDURE — 80053 COMPREHEN METABOLIC PANEL: CPT

## 2025-03-14 PROCEDURE — 85025 COMPLETE CBC W/AUTO DIFF WBC: CPT

## 2025-03-14 PROCEDURE — 85027 COMPLETE CBC AUTOMATED: CPT

## 2025-03-14 PROCEDURE — 83735 ASSAY OF MAGNESIUM: CPT

## 2025-03-14 PROCEDURE — 1220000000 HC SEMI PRIVATE OB R&B

## 2025-03-14 PROCEDURE — 93010 ELECTROCARDIOGRAM REPORT: CPT | Performed by: INTERNAL MEDICINE

## 2025-03-14 PROCEDURE — 82962 GLUCOSE BLOOD TEST: CPT

## 2025-03-14 PROCEDURE — 93005 ELECTROCARDIOGRAM TRACING: CPT | Performed by: OBSTETRICS & GYNECOLOGY

## 2025-03-14 RX ORDER — LABETALOL HYDROCHLORIDE 5 MG/ML
20 INJECTION, SOLUTION INTRAVENOUS ONCE
Status: COMPLETED | OUTPATIENT
Start: 2025-03-14 | End: 2025-03-14

## 2025-03-14 RX ORDER — SODIUM CHLORIDE, SODIUM LACTATE, POTASSIUM CHLORIDE, AND CALCIUM CHLORIDE .6; .31; .03; .02 G/100ML; G/100ML; G/100ML; G/100ML
500 INJECTION, SOLUTION INTRAVENOUS ONCE
Status: DISCONTINUED | OUTPATIENT
Start: 2025-03-14 | End: 2025-03-17

## 2025-03-14 RX ORDER — SODIUM CHLORIDE, SODIUM LACTATE, POTASSIUM CHLORIDE, CALCIUM CHLORIDE 600; 310; 30; 20 MG/100ML; MG/100ML; MG/100ML; MG/100ML
INJECTION, SOLUTION INTRAVENOUS CONTINUOUS
Status: DISCONTINUED | OUTPATIENT
Start: 2025-03-14 | End: 2025-03-14

## 2025-03-14 RX ORDER — FERROUS SULFATE 325(65) MG
325 TABLET ORAL 2 TIMES DAILY WITH MEALS
Status: DISCONTINUED | OUTPATIENT
Start: 2025-03-14 | End: 2025-03-18 | Stop reason: HOSPADM

## 2025-03-14 RX ADMIN — Medication 1 TABLET: at 08:55

## 2025-03-14 RX ADMIN — ENOXAPARIN SODIUM 40 MG: 100 INJECTION SUBCUTANEOUS at 08:54

## 2025-03-14 RX ADMIN — KETOROLAC TROMETHAMINE 30 MG: 30 INJECTION, SOLUTION INTRAMUSCULAR; INTRAVENOUS at 08:56

## 2025-03-14 RX ADMIN — ACETAMINOPHEN 1000 MG: 500 TABLET ORAL at 21:00

## 2025-03-14 RX ADMIN — FERROUS SULFATE TAB 325 MG (65 MG ELEMENTAL FE) 325 MG: 325 (65 FE) TAB at 08:55

## 2025-03-14 RX ADMIN — KETOROLAC TROMETHAMINE 30 MG: 30 INJECTION, SOLUTION INTRAMUSCULAR; INTRAVENOUS at 17:44

## 2025-03-14 RX ADMIN — ACETAMINOPHEN 1000 MG: 500 TABLET ORAL at 13:11

## 2025-03-14 RX ADMIN — DOCUSATE SODIUM 100 MG: 100 CAPSULE, LIQUID FILLED ORAL at 08:55

## 2025-03-14 RX ADMIN — MAGNESIUM SULFATE HEPTAHYDRATE 1000 MG/HR: 40 INJECTION, SOLUTION INTRAVENOUS at 02:17

## 2025-03-14 RX ADMIN — KETOROLAC TROMETHAMINE 30 MG: 30 INJECTION, SOLUTION INTRAMUSCULAR; INTRAVENOUS at 00:09

## 2025-03-14 RX ADMIN — ACETAMINOPHEN 1000 MG: 500 TABLET ORAL at 06:13

## 2025-03-14 RX ADMIN — FERROUS SULFATE TAB 325 MG (65 MG ELEMENTAL FE) 325 MG: 325 (65 FE) TAB at 17:48

## 2025-03-14 RX ADMIN — LABETALOL HYDROCHLORIDE 20 MG: 5 INJECTION, SOLUTION INTRAVENOUS at 20:06

## 2025-03-14 RX ADMIN — DOCUSATE SODIUM 100 MG: 100 CAPSULE, LIQUID FILLED ORAL at 21:00

## 2025-03-14 ASSESSMENT — PAIN DESCRIPTION - RADICULAR PAIN: RADICULAR_PAIN: ABSENT

## 2025-03-14 ASSESSMENT — PAIN - FUNCTIONAL ASSESSMENT
PAIN_FUNCTIONAL_ASSESSMENT: ACTIVITIES ARE NOT PREVENTED

## 2025-03-14 ASSESSMENT — PAIN DESCRIPTION - ORIENTATION
ORIENTATION: LOWER
ORIENTATION: LOWER
ORIENTATION: MID;LOWER
ORIENTATION: MID

## 2025-03-14 ASSESSMENT — PAIN SCALES - GENERAL
PAINLEVEL_OUTOF10: 1
PAINLEVEL_OUTOF10: 3
PAINLEVEL_OUTOF10: 1
PAINLEVEL_OUTOF10: 2
PAINLEVEL_OUTOF10: 1
PAINLEVEL_OUTOF10: 4

## 2025-03-14 ASSESSMENT — PAIN DESCRIPTION - LOCATION
LOCATION: ABDOMEN
LOCATION: ABDOMEN
LOCATION: HEAD;EYE
LOCATION: INCISION
LOCATION: ABDOMEN

## 2025-03-14 ASSESSMENT — PAIN DESCRIPTION - DESCRIPTORS
DESCRIPTORS: CRAMPING
DESCRIPTORS: CRAMPING
DESCRIPTORS: DISCOMFORT;PRESSURE
DESCRIPTORS: CRAMPING

## 2025-03-14 NOTE — CODE DOCUMENTATION
Dr. Lopes at bedside. Pt noted to be sitting in chair. Pt noted to be diaphoretic. Awake and oriented.

## 2025-03-14 NOTE — FLOWSHEET NOTE
Pads changed dwayne care done, patient up to edge of bed, positioned into wheelchair then moved to nursery to visit infant.

## 2025-03-14 NOTE — FLOWSHEET NOTE
Monitor set to cycle BP q5 minutes. Pt remains in wheelchair with primary RN-Stephanie Smyth and additional RN staff, Dr. Lopes and Zahra FINNEGAN while EKG is being performed.

## 2025-03-14 NOTE — FLOWSHEET NOTE
Pt taken to LD3, Mg+ check performed, VS, fundal assement. S/O at bedside, Call light within reach, bed in low position. Educated pt on s/sx of pre-e. Advised if any changes to inform staff.

## 2025-03-14 NOTE — LACTATION NOTE
Lanolin ointment given to mother. Instructed mother that only small amount is needed if she uses. Informed mother to apply small amount to nipple. Informed mother that she does not need to wipe off lanolin because it is safe for the infant. Informed mother that if nipples get sore she can use lanolin ointment, use her own breast milk and to let nipples air dry. Informed mother that these will help decrease soreness. Mother verbalizes understanding.     Breastfeeding booklet along with feeding log sheets given to mother. I    Infant is in SCN. Mother states she would like to use electric breast pump. Hospital electric breast pump to mother and set up and showed her how to use pump. Encouraged mother to pump every 3 hours, even during the night, and to pump for 10-15 minutes. Mother verbalizes understanding. Bottles and labels given to mother. Showed mother how to label bottle and bottle of express breast milk given to nursery nurse.     Mother states that she already has her electric breast pump at home.

## 2025-03-14 NOTE — FLOWSHEET NOTE
Dr. Lopes at bedside strip reviewed.  POC discussed with patient.  Decision made for primary c/section with BPS consents signed.

## 2025-03-14 NOTE — CODE DOCUMENTATION
Rapid Response called overhead to LD03. Patient LOC while sitting in chair. Zahra Estrada, SIVAN and RRT responded.

## 2025-03-14 NOTE — FLOWSHEET NOTE
Patient requesting to get up and move around.  Attempting to obtain orthostatic BP's patient states while sitting at the side of the bed \"I feel shaky\"  Assisted back into semi fowlers position.  CNM notified of severe range BPs.  No new orders received.

## 2025-03-15 PROCEDURE — 94761 N-INVAS EAR/PLS OXIMETRY MLT: CPT

## 2025-03-15 PROCEDURE — 6360000002 HC RX W HCPCS

## 2025-03-15 PROCEDURE — 2500000003 HC RX 250 WO HCPCS: Performed by: OBSTETRICS & GYNECOLOGY

## 2025-03-15 PROCEDURE — 6360000002 HC RX W HCPCS: Performed by: OBSTETRICS & GYNECOLOGY

## 2025-03-15 PROCEDURE — 1220000000 HC SEMI PRIVATE OB R&B

## 2025-03-15 PROCEDURE — 6370000000 HC RX 637 (ALT 250 FOR IP): Performed by: OBSTETRICS & GYNECOLOGY

## 2025-03-15 PROCEDURE — 2580000003 HC RX 258: Performed by: OBSTETRICS & GYNECOLOGY

## 2025-03-15 RX ORDER — NIFEDIPINE 30 MG/1
30 TABLET, EXTENDED RELEASE ORAL DAILY
Status: DISCONTINUED | OUTPATIENT
Start: 2025-03-16 | End: 2025-03-18 | Stop reason: HOSPADM

## 2025-03-15 RX ORDER — LABETALOL HYDROCHLORIDE 5 MG/ML
20 INJECTION, SOLUTION INTRAVENOUS ONCE
Status: COMPLETED | OUTPATIENT
Start: 2025-03-15 | End: 2025-03-15

## 2025-03-15 RX ORDER — LABETALOL HYDROCHLORIDE 5 MG/ML
INJECTION, SOLUTION INTRAVENOUS
Status: COMPLETED
Start: 2025-03-15 | End: 2025-03-15

## 2025-03-15 RX ADMIN — DOCUSATE SODIUM 100 MG: 100 CAPSULE, LIQUID FILLED ORAL at 09:59

## 2025-03-15 RX ADMIN — ENOXAPARIN SODIUM 40 MG: 100 INJECTION SUBCUTANEOUS at 09:15

## 2025-03-15 RX ADMIN — SODIUM CHLORIDE 500 MG: 9 INJECTION, SOLUTION INTRAVENOUS at 09:58

## 2025-03-15 RX ADMIN — LABETALOL HYDROCHLORIDE 20 MG: 5 INJECTION, SOLUTION INTRAVENOUS at 09:10

## 2025-03-15 RX ADMIN — FERROUS SULFATE TAB 325 MG (65 MG ELEMENTAL FE) 325 MG: 325 (65 FE) TAB at 18:04

## 2025-03-15 RX ADMIN — ACETAMINOPHEN 1000 MG: 500 TABLET ORAL at 13:54

## 2025-03-15 RX ADMIN — SODIUM CHLORIDE, PRESERVATIVE FREE 10 ML: 5 INJECTION INTRAVENOUS at 19:20

## 2025-03-15 RX ADMIN — LABETALOL HYDROCHLORIDE 20 MG: 5 INJECTION, SOLUTION INTRAVENOUS at 19:26

## 2025-03-15 RX ADMIN — LABETALOL HYDROCHLORIDE 20 MG: 5 INJECTION INTRAVENOUS at 09:10

## 2025-03-15 RX ADMIN — ACETAMINOPHEN 1000 MG: 500 TABLET ORAL at 05:53

## 2025-03-15 RX ADMIN — IBUPROFEN 800 MG: 800 TABLET, FILM COATED ORAL at 09:14

## 2025-03-15 RX ADMIN — ACETAMINOPHEN 1000 MG: 500 TABLET ORAL at 21:52

## 2025-03-15 RX ADMIN — Medication 1 TABLET: at 09:59

## 2025-03-15 RX ADMIN — IBUPROFEN 800 MG: 800 TABLET, FILM COATED ORAL at 18:02

## 2025-03-15 RX ADMIN — IBUPROFEN 800 MG: 800 TABLET, FILM COATED ORAL at 00:32

## 2025-03-15 ASSESSMENT — PAIN SCALES - GENERAL
PAINLEVEL_OUTOF10: 1
PAINLEVEL_OUTOF10: 4
PAINLEVEL_OUTOF10: 1
PAINLEVEL_OUTOF10: 0

## 2025-03-15 ASSESSMENT — PAIN - FUNCTIONAL ASSESSMENT
PAIN_FUNCTIONAL_ASSESSMENT: ACTIVITIES ARE NOT PREVENTED

## 2025-03-15 ASSESSMENT — PAIN DESCRIPTION - ORIENTATION
ORIENTATION: LOWER;MID
ORIENTATION: LOWER
ORIENTATION: MID

## 2025-03-15 ASSESSMENT — PAIN DESCRIPTION - RADICULAR PAIN: RADICULAR_PAIN: ABSENT

## 2025-03-15 ASSESSMENT — PAIN DESCRIPTION - LOCATION
LOCATION: ABDOMEN
LOCATION: ABDOMEN;INCISION
LOCATION: ABDOMEN;INCISION

## 2025-03-15 ASSESSMENT — PAIN DESCRIPTION - DESCRIPTORS
DESCRIPTORS: ACHING
DESCRIPTORS: GNAWING
DESCRIPTORS: CRAMPING;SHARP

## 2025-03-15 NOTE — ANESTHESIA POSTPROCEDURE EVALUATION
Department of Anesthesiology  Postprocedure Note    Patient: Janki Snyder  MRN: [id number]  YOB: 1980  Date of evaluation: 3/15/2025    Procedure Summary       Date: 25 Room / Location: [address]    Anesthesia Start: 171 Anesthesia Stop:     Procedures:        SECTION (Abdomen)      TUBAL LIGATION (Bilateral)      Labor Analgesia Diagnosis:       Labor and delivery, indication for care      (Labor and delivery, indication for care [O75.9])    Surgeons: Flora Lopes MD Responsible Provider: Jam Brizuela MD    Anesthesia Type: epidural ASA Status: 2 - Emergent            Anesthesia Type: No value filed.    Bishnu Phase I: Bishnu Score: 9    Bishnu Phase II: Bishnu Score: 10    Anesthesia Post Evaluation    Patient location during evaluation: floor  Patient participation: complete - patient participated  Level of consciousness: awake and alert  Pain score: 0  Airway patency: patent  Nausea & Vomiting: no nausea and no vomiting  Cardiovascular status: hemodynamically stable  Respiratory status: room air  Hydration status: euvolemic  Pain management: adequate    No notable events documented.

## 2025-03-16 LAB
ABO + RH BLD: NORMAL
BASOPHILS # BLD: 0.1 K/UL
BASOPHILS NFR BLD: 1 % (ref 0–1)
BILIRUB UR QL STRIP: NEGATIVE
BLOOD BANK SAMPLE EXPIRATION: NORMAL
BLOOD GROUP ANTIBODIES SERPL: NEGATIVE
CHARACTER UR: ABNORMAL
CLARITY UR: ABNORMAL
COLOR UR: YELLOW
EOSINOPHIL # BLD: 0.07 K/UL
EOSINOPHILS RELATIVE PERCENT: 1 % (ref 0–3)
EPI CELLS #/AREA URNS HPF: 27 /HPF
ERYTHROCYTE [DISTWIDTH] IN BLOOD BY AUTOMATED COUNT: 12.4 % (ref 11.7–14.9)
ERYTHROCYTE [DISTWIDTH] IN BLOOD BY AUTOMATED COUNT: 13.2 % (ref 11.7–14.9)
GLUCOSE UR STRIP-MCNC: NEGATIVE MG/DL
HCT VFR BLD AUTO: 24.8 % (ref 37–47)
HCT VFR BLD AUTO: 26.5 % (ref 37–47)
HGB BLD-MCNC: 7 G/DL (ref 12.5–16)
HGB BLD-MCNC: 8.8 G/DL (ref 12.5–16)
HGB UR QL STRIP.AUTO: ABNORMAL
IMM GRANULOCYTES # BLD AUTO: 0.61 K/UL
IMM GRANULOCYTES NFR BLD: 4 %
KETONES UR STRIP-MCNC: NEGATIVE MG/DL
LEUKOCYTE ESTERASE UR QL STRIP: ABNORMAL
LYMPHOCYTES NFR BLD: 1.74 K/UL
LYMPHOCYTES RELATIVE PERCENT: 12 % (ref 24–44)
MCH RBC QN AUTO: 31.7 PG (ref 27–31)
MCH RBC QN AUTO: 31.7 PG (ref 27–31)
MCHC RBC AUTO-ENTMCNC: 28.2 G/DL (ref 32–36)
MCHC RBC AUTO-ENTMCNC: 33.2 G/DL (ref 32–36)
MCV RBC AUTO: 112.2 FL (ref 78–100)
MCV RBC AUTO: 95.3 FL (ref 78–100)
MONOCYTES NFR BLD: 0.79 K/UL
MONOCYTES NFR BLD: 5 % (ref 0–4)
NEUTROPHILS NFR BLD: 78 % (ref 36–66)
NEUTS SEG NFR BLD: 11.42 K/UL
NITRITE UR QL STRIP: NEGATIVE
PH UR STRIP: 7.5 [PH] (ref 5–8)
PLATELET # BLD AUTO: 325 K/UL (ref 140–440)
PLATELET # BLD AUTO: 357 K/UL (ref 140–440)
PMV BLD AUTO: 10.3 FL (ref 7.5–11.1)
PMV BLD AUTO: 10.6 FL (ref 7.5–11.1)
PROT UR STRIP-MCNC: NEGATIVE MG/DL
RBC # BLD AUTO: 2.21 M/UL (ref 4.2–5.4)
RBC # BLD AUTO: 2.78 M/UL (ref 4.2–5.4)
RBC #/AREA URNS HPF: 116 /HPF (ref 0–2)
SP GR UR STRIP: 1.01 (ref 1–1.03)
UROBILINOGEN UR STRIP-ACNC: 0.2 EU/DL (ref 0–1)
WBC #/AREA URNS HPF: 16 /HPF (ref 0–5)
WBC OTHER # BLD: 14.7 K/UL (ref 4–10.5)
WBC OTHER # BLD: 15.5 K/UL (ref 4–10.5)

## 2025-03-16 PROCEDURE — 2500000003 HC RX 250 WO HCPCS: Performed by: OBSTETRICS & GYNECOLOGY

## 2025-03-16 PROCEDURE — 87040 BLOOD CULTURE FOR BACTERIA: CPT

## 2025-03-16 PROCEDURE — 85027 COMPLETE CBC AUTOMATED: CPT

## 2025-03-16 PROCEDURE — 36415 COLL VENOUS BLD VENIPUNCTURE: CPT

## 2025-03-16 PROCEDURE — 6360000002 HC RX W HCPCS: Performed by: OBSTETRICS & GYNECOLOGY

## 2025-03-16 PROCEDURE — 87186 SC STD MICRODIL/AGAR DIL: CPT

## 2025-03-16 PROCEDURE — 6370000000 HC RX 637 (ALT 250 FOR IP): Performed by: OBSTETRICS & GYNECOLOGY

## 2025-03-16 PROCEDURE — 87086 URINE CULTURE/COLONY COUNT: CPT

## 2025-03-16 PROCEDURE — 81001 URINALYSIS AUTO W/SCOPE: CPT

## 2025-03-16 PROCEDURE — 94761 N-INVAS EAR/PLS OXIMETRY MLT: CPT

## 2025-03-16 PROCEDURE — 1220000000 HC SEMI PRIVATE OB R&B

## 2025-03-16 PROCEDURE — 87077 CULTURE AEROBIC IDENTIFY: CPT

## 2025-03-16 PROCEDURE — 86850 RBC ANTIBODY SCREEN: CPT

## 2025-03-16 PROCEDURE — 36430 TRANSFUSION BLD/BLD COMPNT: CPT

## 2025-03-16 PROCEDURE — 85025 COMPLETE CBC W/AUTO DIFF WBC: CPT

## 2025-03-16 PROCEDURE — 86901 BLOOD TYPING SEROLOGIC RH(D): CPT

## 2025-03-16 PROCEDURE — 86900 BLOOD TYPING SEROLOGIC ABO: CPT

## 2025-03-16 RX ORDER — SODIUM CHLORIDE 9 MG/ML
INJECTION, SOLUTION INTRAVENOUS PRN
Status: DISCONTINUED | OUTPATIENT
Start: 2025-03-16 | End: 2025-03-17

## 2025-03-16 RX ADMIN — IBUPROFEN 800 MG: 800 TABLET, FILM COATED ORAL at 19:01

## 2025-03-16 RX ADMIN — NIFEDIPINE 30 MG: 30 TABLET, FILM COATED, EXTENDED RELEASE ORAL at 08:17

## 2025-03-16 RX ADMIN — DOCUSATE SODIUM 100 MG: 100 CAPSULE, LIQUID FILLED ORAL at 08:17

## 2025-03-16 RX ADMIN — IBUPROFEN 800 MG: 800 TABLET, FILM COATED ORAL at 02:22

## 2025-03-16 RX ADMIN — SODIUM CHLORIDE, PRESERVATIVE FREE 10 ML: 5 INJECTION INTRAVENOUS at 08:17

## 2025-03-16 RX ADMIN — ENOXAPARIN SODIUM 40 MG: 100 INJECTION SUBCUTANEOUS at 08:17

## 2025-03-16 RX ADMIN — ACETAMINOPHEN 1000 MG: 500 TABLET ORAL at 15:19

## 2025-03-16 RX ADMIN — IBUPROFEN 800 MG: 800 TABLET, FILM COATED ORAL at 08:16

## 2025-03-16 RX ADMIN — Medication 1 TABLET: at 08:17

## 2025-03-16 RX ADMIN — FERROUS SULFATE TAB 325 MG (65 MG ELEMENTAL FE) 325 MG: 325 (65 FE) TAB at 08:17

## 2025-03-16 RX ADMIN — FERROUS SULFATE TAB 325 MG (65 MG ELEMENTAL FE) 325 MG: 325 (65 FE) TAB at 19:02

## 2025-03-16 ASSESSMENT — PAIN SCALES - GENERAL: PAINLEVEL_OUTOF10: 2

## 2025-03-16 NOTE — FLOWSHEET NOTE
Telephone report to Dr. Lopes regarding patient near syncope and current VS. To remain on L & D at this time for rest, up to bathroom only with assistance.

## 2025-03-16 NOTE — CONSENT
Informed Consent for Blood Component Transfusion Note    I have discussed with the patient the rationale for blood component transfusion; its benefits in treating or preventing fatigue, organ damage, or death; and its risk which includes mild transfusion reactions, rare risk of blood borne infection, or more serious but rare reactions. I have discussed the alternatives to transfusion, including the risk and consequences of not receiving transfusion. The patient had an opportunity to ask questions and had agreed to proceed with transfusion of blood components.    Electronically signed by Flora Lopes MD on 3/16/25 at 12:12 PM EDT

## 2025-03-16 NOTE — FLOWSHEET NOTE
states additional test for procalcitonin cannot be run from the samples sent earlier this evening. Patient declines any additional draws at this time d/t numerous venipunctures already today. She agrees to 0600 labs. Will collect at that time.

## 2025-03-16 NOTE — CONSULTS
been underwent surgery she has had 3 episodes like this one of them she did pass out but regained consciousness within seconds.  She denies any cough shortness of breath chest pain nausea vomiting diarrhea abdominal pain urinary or bowel changes she feels warm all the time denies any shakes or chills.      Review of Systems:    Review of Systems    As above    Objective:     Intake/Output Summary (Last 24 hours) at 3/16/2025 1150  Last data filed at 3/15/2025 1930  Gross per 24 hour   Intake 10 ml   Output 2150 ml   Net -2140 ml      Vitals:   Vitals:    03/16/25 0300 03/16/25 0807 03/16/25 0933 03/16/25 0947   BP: (!) 145/82 (!) 149/78 (!) 166/80 (!) 150/75   Pulse: 98 96 (!) 113 (!) 107   Resp: 16 18 20    Temp:  98.1 °F (36.7 °C)     TempSrc:  Oral     SpO2: 98% 99%     Weight:       Height:           Medications Prior to Admission     Prior to Admission medications    Medication Sig Start Date End Date Taking? Authorizing Provider   Prenatal Vit-Fe Fumarate-FA (CLASSIC PRENATAL) 28-0.8 MG TABS Take 1 tablet by mouth daily 1/17/25  Yes ProviderJada MD   magnesium oxide (MAG-OX) 400 (240 Mg) MG tablet Take 1 tablet by mouth daily   Yes ProviderJada MD       Physical Exam: Need 8 Elements   BP (!) 150/75   Pulse (!) 107   Temp 98.1 °F (36.7 °C) (Oral)   Resp 20   Ht 1.803 m (5' 11\")   Wt 72.6 kg (160 lb)   SpO2 99%   Breastfeeding Unknown   BMI 22.32 kg/m²   Physical Exam  Constitutional:       General: She is not in acute distress.     Appearance: She is normal weight.   HENT:      Head: Normocephalic.      Nose: Nose normal.      Mouth/Throat:      Mouth: Mucous membranes are moist.   Eyes:      Extraocular Movements: Extraocular movements intact.      Pupils: Pupils are equal, round, and reactive to light.   Cardiovascular:      Rate and Rhythm: Normal rate and regular rhythm.      Heart sounds: No murmur heard.     No gallop.   Pulmonary:      Effort: Pulmonary effort is normal. No

## 2025-03-16 NOTE — FLOWSHEET NOTE
Attempted to get patient up to start walking. She sat on the edge of the bed for approximately 10 minutes before fully standing. Patient stated she felt like she was going to pass out. Patient reported tunnel vision and lightheadedness. Assisted patient back to bed.

## 2025-03-16 NOTE — FLOWSHEET NOTE
Orthostatic Testing     03/16/25 1500   Maternal Vitals (MEW-T)   Blood Pressure Lying 146/77   Pulse Lying 106 PER MINUTE   Blood Pressure Sitting 159/79   Pulse Sitting 128 PER MINUTE   Blood Pressure Standing 173/88   Pulse Standing 131 PER MINUTE   SpO2 100 %

## 2025-03-17 ENCOUNTER — APPOINTMENT (OUTPATIENT)
Dept: CT IMAGING | Age: 45
End: 2025-03-17
Payer: COMMERCIAL

## 2025-03-17 LAB
ABSOLUTE BANDS: 0.38 K/UL
ALBUMIN SERPL-MCNC: 3.1 G/DL (ref 3.4–5)
ALBUMIN/GLOB SERPL: 1.3 {RATIO} (ref 1.1–2.2)
ALP SERPL-CCNC: 108 U/L (ref 40–129)
ALT SERPL-CCNC: 69 U/L (ref 10–40)
ANION GAP SERPL CALCULATED.3IONS-SCNC: 10 MMOL/L (ref 9–17)
AST SERPL-CCNC: 105 U/L (ref 15–37)
BANDS: 3 %
BASOPHILS # BLD: 0.13 K/UL
BASOPHILS NFR BLD: 1 % (ref 0–1)
BILIRUB SERPL-MCNC: 0.3 MG/DL (ref 0–1)
BUN SERPL-MCNC: 9 MG/DL (ref 7–20)
CALCIUM SERPL-MCNC: 8.4 MG/DL (ref 8.3–10.6)
CHLORIDE SERPL-SCNC: 103 MMOL/L (ref 99–110)
CO2 SERPL-SCNC: 26 MMOL/L (ref 21–32)
CREAT SERPL-MCNC: 0.5 MG/DL (ref 0.6–1.1)
D DIMER PPP FEU-MCNC: 1.74 UG/ML FEU (ref 0–0.46)
EOSINOPHIL # BLD: 0 K/UL
EOSINOPHILS RELATIVE PERCENT: 0 % (ref 0–3)
ERYTHROCYTE [DISTWIDTH] IN BLOOD BY AUTOMATED COUNT: 13.5 % (ref 11.7–14.9)
GFR, ESTIMATED: >90 ML/MIN/1.73M2
GLUCOSE SERPL-MCNC: 86 MG/DL (ref 74–99)
HCT VFR BLD AUTO: 26.1 % (ref 37–47)
HCT VFR BLD AUTO: 28 % (ref 37–47)
HGB BLD-MCNC: 8.6 G/DL (ref 12.5–16)
HGB BLD-MCNC: 9.1 G/DL (ref 12.5–16)
LYMPHOCYTES NFR BLD: 1.27 K/UL
LYMPHOCYTES RELATIVE PERCENT: 10 % (ref 24–44)
MCH RBC QN AUTO: 31.2 PG (ref 27–31)
MCHC RBC AUTO-ENTMCNC: 33 G/DL (ref 32–36)
MCV RBC AUTO: 94.6 FL (ref 78–100)
MONOCYTES NFR BLD: 0.25 K/UL
MONOCYTES NFR BLD: 2 % (ref 0–4)
MYELOCYTES ABSOLUTE COUNT: 0.25 K/UL
MYELOCYTES: 2 %
NEUTROPHILS NFR BLD: 82 % (ref 36–66)
NEUTS SEG NFR BLD: 10.41 K/UL
PLATELET # BLD AUTO: 340 K/UL (ref 140–440)
PLATELET ESTIMATE: NORMAL
PMV BLD AUTO: 9.8 FL (ref 7.5–11.1)
POTASSIUM SERPL-SCNC: 4.2 MMOL/L (ref 3.5–5.1)
PROCALCITONIN SERPL-MCNC: 0.09 NG/ML
PROT SERPL-MCNC: 5.4 G/DL (ref 6.4–8.2)
RBC # BLD AUTO: 2.76 M/UL (ref 4.2–5.4)
RBC # BLD: NORMAL 10*6/UL
SODIUM SERPL-SCNC: 139 MMOL/L (ref 136–145)
SURGICAL PATHOLOGY REPORT: NORMAL
WBC # BLD: NORMAL 10*3/UL
WBC OTHER # BLD: 12.7 K/UL (ref 4–10.5)

## 2025-03-17 PROCEDURE — 2500000003 HC RX 250 WO HCPCS: Performed by: OBSTETRICS & GYNECOLOGY

## 2025-03-17 PROCEDURE — 6370000000 HC RX 637 (ALT 250 FOR IP): Performed by: ADVANCED PRACTICE MIDWIFE

## 2025-03-17 PROCEDURE — 85379 FIBRIN DEGRADATION QUANT: CPT

## 2025-03-17 PROCEDURE — 84145 PROCALCITONIN (PCT): CPT

## 2025-03-17 PROCEDURE — 85025 COMPLETE CBC W/AUTO DIFF WBC: CPT

## 2025-03-17 PROCEDURE — P9016 RBC LEUKOCYTES REDUCED: HCPCS

## 2025-03-17 PROCEDURE — 80053 COMPREHEN METABOLIC PANEL: CPT

## 2025-03-17 PROCEDURE — 30233N1 TRANSFUSION OF NONAUTOLOGOUS RED BLOOD CELLS INTO PERIPHERAL VEIN, PERCUTANEOUS APPROACH: ICD-10-PCS | Performed by: OBSTETRICS & GYNECOLOGY

## 2025-03-17 PROCEDURE — 85014 HEMATOCRIT: CPT

## 2025-03-17 PROCEDURE — 6360000002 HC RX W HCPCS: Performed by: OBSTETRICS & GYNECOLOGY

## 2025-03-17 PROCEDURE — 85018 HEMOGLOBIN: CPT

## 2025-03-17 PROCEDURE — 6360000004 HC RX CONTRAST MEDICATION: Performed by: INTERNAL MEDICINE

## 2025-03-17 PROCEDURE — 6370000000 HC RX 637 (ALT 250 FOR IP): Performed by: OBSTETRICS & GYNECOLOGY

## 2025-03-17 PROCEDURE — 71275 CT ANGIOGRAPHY CHEST: CPT

## 2025-03-17 PROCEDURE — 1220000000 HC SEMI PRIVATE OB R&B

## 2025-03-17 RX ORDER — IOPAMIDOL 755 MG/ML
75 INJECTION, SOLUTION INTRAVASCULAR
Status: COMPLETED | OUTPATIENT
Start: 2025-03-17 | End: 2025-03-17

## 2025-03-17 RX ORDER — 0.9 % SODIUM CHLORIDE 0.9 %
500 INTRAVENOUS SOLUTION INTRAVENOUS ONCE
Status: DISCONTINUED | OUTPATIENT
Start: 2025-03-17 | End: 2025-03-17

## 2025-03-17 RX ORDER — LABETALOL 100 MG/1
100 TABLET, FILM COATED ORAL EVERY 12 HOURS SCHEDULED
Status: DISCONTINUED | OUTPATIENT
Start: 2025-03-17 | End: 2025-03-18 | Stop reason: HOSPADM

## 2025-03-17 RX ADMIN — IBUPROFEN 800 MG: 800 TABLET, FILM COATED ORAL at 18:16

## 2025-03-17 RX ADMIN — DOCUSATE SODIUM 100 MG: 100 CAPSULE, LIQUID FILLED ORAL at 20:32

## 2025-03-17 RX ADMIN — IBUPROFEN 800 MG: 800 TABLET, FILM COATED ORAL at 08:49

## 2025-03-17 RX ADMIN — SODIUM CHLORIDE, PRESERVATIVE FREE 10 ML: 5 INJECTION INTRAVENOUS at 08:40

## 2025-03-17 RX ADMIN — DOCUSATE SODIUM 100 MG: 100 CAPSULE, LIQUID FILLED ORAL at 02:16

## 2025-03-17 RX ADMIN — FERROUS SULFATE TAB 325 MG (65 MG ELEMENTAL FE) 325 MG: 325 (65 FE) TAB at 08:29

## 2025-03-17 RX ADMIN — FERROUS SULFATE TAB 325 MG (65 MG ELEMENTAL FE) 325 MG: 325 (65 FE) TAB at 18:16

## 2025-03-17 RX ADMIN — ACETAMINOPHEN 1000 MG: 500 TABLET ORAL at 02:16

## 2025-03-17 RX ADMIN — LABETALOL HYDROCHLORIDE 100 MG: 100 TABLET, FILM COATED ORAL at 20:32

## 2025-03-17 RX ADMIN — Medication 1 TABLET: at 08:29

## 2025-03-17 RX ADMIN — NIFEDIPINE 30 MG: 30 TABLET, FILM COATED, EXTENDED RELEASE ORAL at 08:30

## 2025-03-17 RX ADMIN — ENOXAPARIN SODIUM 40 MG: 100 INJECTION SUBCUTANEOUS at 08:31

## 2025-03-17 RX ADMIN — IOPAMIDOL 75 ML: 755 INJECTION, SOLUTION INTRAVENOUS at 22:06

## 2025-03-17 RX ADMIN — DOCUSATE SODIUM 100 MG: 100 CAPSULE, LIQUID FILLED ORAL at 08:30

## 2025-03-17 ASSESSMENT — PAIN - FUNCTIONAL ASSESSMENT
PAIN_FUNCTIONAL_ASSESSMENT: ACTIVITIES ARE NOT PREVENTED

## 2025-03-17 ASSESSMENT — PAIN DESCRIPTION - FREQUENCY
FREQUENCY: INTERMITTENT
FREQUENCY: INTERMITTENT

## 2025-03-17 ASSESSMENT — PAIN DESCRIPTION - LOCATION
LOCATION: HEAD
LOCATION: HEAD
LOCATION: ABDOMEN
LOCATION: ABDOMEN

## 2025-03-17 ASSESSMENT — PAIN DESCRIPTION - ORIENTATION
ORIENTATION: ANTERIOR
ORIENTATION: LOWER
ORIENTATION: LOWER
ORIENTATION: ANTERIOR

## 2025-03-17 ASSESSMENT — PAIN DESCRIPTION - DESCRIPTORS
DESCRIPTORS: CRAMPING
DESCRIPTORS: ACHING
DESCRIPTORS: ACHING
DESCRIPTORS: CRAMPING

## 2025-03-17 ASSESSMENT — PAIN DESCRIPTION - PAIN TYPE
TYPE: ACUTE PAIN
TYPE: ACUTE PAIN

## 2025-03-17 ASSESSMENT — PAIN SCALES - GENERAL
PAINLEVEL_OUTOF10: 2
PAINLEVEL_OUTOF10: 2
PAINLEVEL_OUTOF10: 4
PAINLEVEL_OUTOF10: 4

## 2025-03-17 ASSESSMENT — PAIN DESCRIPTION - ONSET
ONSET: GRADUAL
ONSET: GRADUAL

## 2025-03-17 NOTE — FLOWSHEET NOTE
Dr Boss returned phone call and discussed blood pressures and plan of care with Zahra FINNEGAN. IV fluid bolus held/discontinued d/t elevated BP.

## 2025-03-17 NOTE — FLOWSHEET NOTE
Patient assisted up to the bathroom per request. Patient stedy and denies dizziness. Personal hygiene care completed while in the bathroom. Bed linen changed. Patient ambulatory back to bed. Blood pressure rechecked and within normal limits.

## 2025-03-18 VITALS
HEART RATE: 94 BPM | SYSTOLIC BLOOD PRESSURE: 138 MMHG | RESPIRATION RATE: 18 BRPM | BODY MASS INDEX: 22.4 KG/M2 | TEMPERATURE: 99.2 F | HEIGHT: 71 IN | DIASTOLIC BLOOD PRESSURE: 80 MMHG | OXYGEN SATURATION: 97 % | WEIGHT: 160 LBS

## 2025-03-18 LAB
ABO/RH: NORMAL
ALBUMIN SERPL-MCNC: 3 G/DL (ref 3.4–5)
ALBUMIN/GLOB SERPL: 1.4 {RATIO} (ref 1.1–2.2)
ALP SERPL-CCNC: 95 U/L (ref 40–129)
ALT SERPL-CCNC: 76 U/L (ref 10–40)
ANION GAP SERPL CALCULATED.3IONS-SCNC: 10 MMOL/L (ref 9–17)
ANTIBODY SCREEN: NEGATIVE
AST SERPL-CCNC: 76 U/L (ref 15–37)
BASOPHILS # BLD: 0.05 K/UL
BASOPHILS NFR BLD: 1 % (ref 0–1)
BILIRUB SERPL-MCNC: 0.2 MG/DL (ref 0–1)
BLOOD BANK BLOOD PRODUCT EXPIRATION DATE: NORMAL
BLOOD BANK DISPENSE STATUS: NORMAL
BLOOD BANK ISBT PRODUCT BLOOD TYPE: 6200
BLOOD BANK PRODUCT CODE: NORMAL
BLOOD BANK SAMPLE EXPIRATION: NORMAL
BLOOD BANK UNIT TYPE AND RH: NORMAL
BPU ID: NORMAL
BUN SERPL-MCNC: 10 MG/DL (ref 7–20)
CALCIUM SERPL-MCNC: 8.3 MG/DL (ref 8.3–10.6)
CHLORIDE SERPL-SCNC: 105 MMOL/L (ref 99–110)
CO2 SERPL-SCNC: 24 MMOL/L (ref 21–32)
COMPONENT: NORMAL
CREAT SERPL-MCNC: 0.5 MG/DL (ref 0.6–1.1)
CROSSMATCH RESULT: NORMAL
EOSINOPHIL # BLD: 0.23 K/UL
EOSINOPHILS RELATIVE PERCENT: 2 % (ref 0–3)
ERYTHROCYTE [DISTWIDTH] IN BLOOD BY AUTOMATED COUNT: 13.2 % (ref 11.7–14.9)
GFR, ESTIMATED: >90 ML/MIN/1.73M2
GLUCOSE SERPL-MCNC: 83 MG/DL (ref 74–99)
HCT VFR BLD AUTO: 24.9 % (ref 37–47)
HGB BLD-MCNC: 8.2 G/DL (ref 12.5–16)
IMM GRANULOCYTES # BLD AUTO: 0.5 K/UL
IMM GRANULOCYTES NFR BLD: 5 %
LYMPHOCYTES NFR BLD: 1.67 K/UL
LYMPHOCYTES RELATIVE PERCENT: 16 % (ref 24–44)
MCH RBC QN AUTO: 31.7 PG (ref 27–31)
MCHC RBC AUTO-ENTMCNC: 32.9 G/DL (ref 32–36)
MCV RBC AUTO: 96.1 FL (ref 78–100)
MONOCYTES NFR BLD: 0.63 K/UL
MONOCYTES NFR BLD: 6 % (ref 0–4)
NEUTROPHILS NFR BLD: 70 % (ref 36–66)
NEUTS SEG NFR BLD: 7.15 K/UL
PLATELET # BLD AUTO: 359 K/UL (ref 140–440)
PMV BLD AUTO: 9.5 FL (ref 7.5–11.1)
POTASSIUM SERPL-SCNC: 4.5 MMOL/L (ref 3.5–5.1)
PROT SERPL-MCNC: 5.1 G/DL (ref 6.4–8.2)
RBC # BLD AUTO: 2.59 M/UL (ref 4.2–5.4)
SODIUM SERPL-SCNC: 139 MMOL/L (ref 136–145)
TRANSFUSION STATUS: NORMAL
TSH SERPL DL<=0.05 MIU/L-ACNC: 2.16 UIU/ML (ref 0.27–4.2)
UNIT DIVISION: 0
UNIT ISSUE DATE/TIME: NORMAL
WBC OTHER # BLD: 10.2 K/UL (ref 4–10.5)

## 2025-03-18 PROCEDURE — 6370000000 HC RX 637 (ALT 250 FOR IP): Performed by: OBSTETRICS & GYNECOLOGY

## 2025-03-18 PROCEDURE — 6360000002 HC RX W HCPCS: Performed by: OBSTETRICS & GYNECOLOGY

## 2025-03-18 PROCEDURE — 99239 HOSP IP/OBS DSCHRG MGMT >30: CPT | Performed by: NURSE PRACTITIONER

## 2025-03-18 PROCEDURE — 85025 COMPLETE CBC W/AUTO DIFF WBC: CPT

## 2025-03-18 PROCEDURE — 84443 ASSAY THYROID STIM HORMONE: CPT

## 2025-03-18 PROCEDURE — 2500000003 HC RX 250 WO HCPCS: Performed by: OBSTETRICS & GYNECOLOGY

## 2025-03-18 PROCEDURE — 80053 COMPREHEN METABOLIC PANEL: CPT

## 2025-03-18 PROCEDURE — 6370000000 HC RX 637 (ALT 250 FOR IP): Performed by: ADVANCED PRACTICE MIDWIFE

## 2025-03-18 RX ORDER — OXYCODONE HYDROCHLORIDE 5 MG/1
5 TABLET ORAL EVERY 6 HOURS PRN
Qty: 10 TABLET | Refills: 0 | Status: SHIPPED | OUTPATIENT
Start: 2025-03-18 | End: 2025-03-21

## 2025-03-18 RX ORDER — PSEUDOEPHEDRINE HCL 30 MG
100 TABLET ORAL 2 TIMES DAILY
Qty: 30 CAPSULE | Refills: 0 | Status: SHIPPED | OUTPATIENT
Start: 2025-03-18

## 2025-03-18 RX ORDER — IBUPROFEN 800 MG/1
800 TABLET, FILM COATED ORAL EVERY 8 HOURS
Qty: 120 TABLET | Refills: 3 | Status: SHIPPED | OUTPATIENT
Start: 2025-03-18

## 2025-03-18 RX ORDER — LABETALOL 100 MG/1
100 TABLET, FILM COATED ORAL EVERY 12 HOURS SCHEDULED
Qty: 60 TABLET | Refills: 3 | Status: SHIPPED | OUTPATIENT
Start: 2025-03-18

## 2025-03-18 RX ADMIN — IBUPROFEN 800 MG: 800 TABLET, FILM COATED ORAL at 01:45

## 2025-03-18 RX ADMIN — FERROUS SULFATE TAB 325 MG (65 MG ELEMENTAL FE) 325 MG: 325 (65 FE) TAB at 16:53

## 2025-03-18 RX ADMIN — FERROUS SULFATE TAB 325 MG (65 MG ELEMENTAL FE) 325 MG: 325 (65 FE) TAB at 09:11

## 2025-03-18 RX ADMIN — IBUPROFEN 800 MG: 800 TABLET, FILM COATED ORAL at 09:10

## 2025-03-18 RX ADMIN — ENOXAPARIN SODIUM 40 MG: 100 INJECTION SUBCUTANEOUS at 09:11

## 2025-03-18 RX ADMIN — IBUPROFEN 800 MG: 800 TABLET, FILM COATED ORAL at 16:53

## 2025-03-18 RX ADMIN — DOCUSATE SODIUM 100 MG: 100 CAPSULE, LIQUID FILLED ORAL at 09:11

## 2025-03-18 RX ADMIN — LABETALOL HYDROCHLORIDE 100 MG: 100 TABLET, FILM COATED ORAL at 09:10

## 2025-03-18 RX ADMIN — Medication 1 TABLET: at 09:10

## 2025-03-18 RX ADMIN — SODIUM CHLORIDE, PRESERVATIVE FREE 10 ML: 5 INJECTION INTRAVENOUS at 09:11

## 2025-03-18 ASSESSMENT — PAIN SCALES - GENERAL
PAINLEVEL_OUTOF10: 3
PAINLEVEL_OUTOF10: 4

## 2025-03-18 ASSESSMENT — PAIN DESCRIPTION - PAIN TYPE: TYPE: ACUTE PAIN

## 2025-03-18 ASSESSMENT — PAIN DESCRIPTION - DESCRIPTORS: DESCRIPTORS: ACHING

## 2025-03-18 ASSESSMENT — PAIN - FUNCTIONAL ASSESSMENT
PAIN_FUNCTIONAL_ASSESSMENT: ACTIVITIES ARE NOT PREVENTED
PAIN_FUNCTIONAL_ASSESSMENT: ACTIVITIES ARE NOT PREVENTED

## 2025-03-18 ASSESSMENT — PAIN DESCRIPTION - LOCATION
LOCATION: HEAD
LOCATION: HEAD

## 2025-03-18 ASSESSMENT — PAIN DESCRIPTION - ORIENTATION: ORIENTATION: MID

## 2025-03-18 NOTE — PLAN OF CARE
Problem: Pain  Goal: Verbalizes/displays adequate comfort level or baseline comfort level  3/17/2025 2329 by Sparkle Ortiz RN  Outcome: Progressing     Problem: Postpartum  Goal: Experiences normal postpartum course  Description:  Postpartum OB-Pregnancy care plan goal which identifies if the mother is experiencing a normal postpartum course  Outcome: Progressing  Flowsheets (Taken 3/18/2025 0848)  Experiences Normal Postpartum Course:   Monitor maternal vital signs   Assess uterine involution  Goal: Appropriate maternal -  bonding  Description:  Postpartum OB-Pregnancy care plan goal which identifies if the mother and  are bonding appropriately  Outcome: Progressing  Goal: Establishment of infant feeding pattern  Description:  Postpartum OB-Pregnancy care plan goal which identifies if the mother is establishing a feeding pattern with their   Outcome: Progressing  Flowsheets (Taken 3/18/2025 0848)  Establishment of Infant Feeding Pattern:   Assess breast/bottle feeding   Refer to lactation as needed  Goal: Incisions, wounds, or drain sites healing without S/S of infection  Outcome: Progressing  Flowsheets (Taken 3/18/2025 0848)  Incisions, Wounds, or Drain Sites Healing Without Sign and Symptoms of Infection:   TWICE DAILY: Assess and document skin integrity   ADMISSION and DAILY: Assess and document risk factors for pressure ulcer development   TWICE DAILY: Assess and document dressing/incision, wound bed, drain sites and surrounding tissue   Implement wound care per orders   Initiate isolation precautions as appropriate   Initiate pressure ulcer prevention bundle as indicated     Problem: Infection - Adult  Goal: Absence of infection at discharge  Outcome: Progressing  Flowsheets (Taken 3/18/2025 0848)  Absence of infection at discharge:   Assess and monitor for signs and symptoms of infection   Monitor lab/diagnostic results   Monitor all insertion sites i.e., indwelling lines, tubes 
  Problem: Pain  Goal: Verbalizes/displays adequate comfort level or baseline comfort level  Outcome: Progressing     
period:   Monitor white blood cell count   Administer growth factors as ordered   Implement neutropenic guidelines     Problem: Safety - Adult  Goal: Free from fall injury  3/18/2025 1445 by Rae Allen RN  Outcome: Adequate for Discharge  3/18/2025 0848 by Rae Allen RN  Outcome: Progressing  Flowsheets (Taken 3/18/2025 0848)  Free From Fall Injury:   Based on caregiver fall risk screen, instruct family/caregiver to ask for assistance with transferring infant if caregiver noted to have fall risk factors   Instruct family/caregiver on patient safety     Problem: Discharge Planning  Goal: Discharge to home or other facility with appropriate resources  3/18/2025 1445 by Rae Allen RN  Outcome: Adequate for Discharge  3/18/2025 0848 by Rae Allen RN  Outcome: Progressing  Flowsheets (Taken 3/18/2025 0848)  Discharge to home or other facility with appropriate resources:   Identify barriers to discharge with patient and caregiver   Arrange for needed discharge resources and transportation as appropriate   Identify discharge learning needs (meds, wound care, etc)   Arrange for interpreters to assist at discharge as needed   Refer to discharge planning if patient needs post-hospital services based on physician order or complex needs related to functional status, cognitive ability or social support system     Problem: Chronic Conditions and Co-morbidities  Goal: Patient's chronic conditions and co-morbidity symptoms are monitored and maintained or improved  3/18/2025 1445 by Rae Allen RN  Outcome: Adequate for Discharge  3/18/2025 0848 by Rae Allen RN  Outcome: Progressing  Flowsheets (Taken 3/18/2025 0848)  Care Plan - Patient's Chronic Conditions and Co-Morbidity Symptoms are Monitored and Maintained or Improved:   Monitor and assess patient's chronic conditions and comorbid symptoms for stability, deterioration, or improvement   Collaborate with multidisciplinary team to address chronic and comorbid

## 2025-03-18 NOTE — DISCHARGE SUMMARY
Obstetrical Discharge Form    Gestational Age:  35w1d    Antepartum complications: severe preeclampsia, placenta abruption     Date of Delivery:   3/13/25      Type of Delivery:    for fetal distress    Delivered By:    Dr Lopes              Baby:       Information for the patient's :  Yung Snyder [[id number]]      Anesthesia:    Spinal    Intrapartum complications: Abruptio Placenta    Feeding method:   breast    Postpartum complications: preeclampsia/eclampsia    Discharge Date:   3/18/25    Condition of discharge:  good    BP (!) 142/82   Pulse 97   Temp 99.2 °F (37.3 °C) (Oral)   Resp 18   Ht 1.803 m (5' 11\")   Wt 72.6 kg (160 lb)   SpO2 98%   Breastfeeding Unknown   BMI 22.32 kg/m²        Plan:     Discharge teaching completed including counseling on warning signs (heavy vaginal bleeding, s/s of preeclampsia, fever >100.4, ACHES, s/s of PPD).    Follow up    in 3 day(s) for a blood pressure  check

## 2025-03-18 NOTE — DISCHARGE INSTRUCTIONS
Discharge Instructions    Thank you for letting us care for you and your family.The following are  discharge instructions for yourself and your baby. If you have any questions once you have arrived home please feel free to contact the birthing center at [phone].            MOM INSTRUCTIONS    DIET    Eat a well balanced diet focusing on foods high in fiber and protein.  Drink plenty of fluids (  8 to 10 glasses a day) especially water.  To avoid constipation you may take a mild stool softener as recommended by your doctor or midwife.    ACTIVITY    Gradually increase your activity. Resume your exercise regimen only after advised by you doctor or midwife.  Avoid lifting anything heavier than your baby for 6 weeks or until otherwise advised by your doctor or midwife.  Avoid driving for 2 weeks or if taking narcotics.  Climb stairs one at a time. Use caution when carrying your baby up and down the stairs.  NO SEXUAL ACTIVITY and nothing in your vagina( tampons or douching) for 4 to 6 weeks or until advised by your doctor or midwife.  Be prepared to discuss family planning at your follow-up OB visit.   You may feel tired or have a lack of energy. Nap when the baby naps to catch up on your sleep.You may continue to take prenatal vitamin to replenish nutrients post delivery as directed by your doctor or midwife.      EMOTIONS    You may feel sad, teary, overwhelmed and love. Contact your OB provider if symptoms worsen or last more than 2 weeks. Contact your OB provider if you have thoughts of harming yourself or your baby.  If your baby will not stop crying and you are feeling overwhelmed, place your baby in a crib on their back and contact another adult for help. NEVER SHAKE A BABY.                   BLEEDING    Your vaginal bleeding will decrease in amount over the next 2 to 6 weeks.  You will notice that as your activity increases your flow may increase. This is your body's way of telling you to take it easy and rest

## 2025-03-18 NOTE — PROGRESS NOTES
V2.0    [facility] [place]      Name:  Janki Snyder /Age/Sex: 1980  (44 y.o. female)   MRN & CSN:  [id number] & [id number] Encounter Date/Time: 3/17/2025 12:03 PM EDT   Location:  [facility]/03A PCP: Francisco J Barbosa MD     Attending:Flora Lopes MD       Hospital Day: 5    Assessment and Recommendations   Janki Snyder is a 44 y.o. female who presents with Preeclampsia, third trimester      Recommendations:  Severe preeclampsia: Status post  section on 3/13, started on Procardia 30 mg for high blood pressure  Syncope/presyncope: Likely secondary to combination of dehydration post surgery, significant drop in hemoglobin probably is contributing, 1 dose of IV Venofer given, patient got 1 unit of PRBC on 3/16, , less likely secondary to any infection but due to elevated white blood cell count and borderline high temperature will check blood culture and UA to rule out any infection follow-up on cultures.  Acute anemia secondary to above trend H&H  Elevated LFTs suspected secondary to preeclampsia monitor labs a.m.     Comment: Please note this report has been produced using speech recognition software and may contain errors related to that system including errors in grammar, punctuation, and spelling, as well as words and phrases that may be inappropriate. If there are any questions or concerns please feel free to contact the dictating provider for clarification.     Diet ADULT DIET; Regular   DVT Prophylaxis [] Lovenox, []  Heparin, [x] SCDs, [] Ambulation,  [] Eliquis, [] Xarelto  [] Coumadin   Code Status Full Code   Disposition From: Home  Expected Disposition: Home  Estimated Date of Discharge: 3/18  Patient requires continued admission due to clinical improvement   Surrogate Decision Maker/ POA      Personally reviewed Lab Studies and Imaging       Subjective:     Chief Complaint:   Chief Complaint   Patient presents with    Vaginal Bleeding         Patient seen and examined at 
Called to pt's room for syncopal episode. Pt was in a wheelchair and began feeling lightheaded. She lost consciousness temporarily. A rapid response was called, but was cancelled as she regained consciousness quickly. Her magnesium was turned off. CBC, CMP and mag level were drawn. POC BS was 112.EKG was completed at bedside and showed normal sinus rhythm, normal EKG. She was hypotensive with lowest BP 72/49. She was given a 500 mL IVF bolus and started to feel better.  BP improved and pt was transferred to the bed. Will leave magnesium off and await blood work results.   
Called to pt's room for vaginal bleeding, recurrent late decelerations, and concern for uterine abruption. Pt is having contractions about q 1-2 mins s/p 1 dose breathine. She has significant vaginal bleeding and periods of recurrent late decelerations. Pt is remote from delivery. Recommend a  section for uterine abruption and non-reassuring FHT.     The r/b/a were discussed with the patient and her . The risks discussed include but are not limited to risks of anesthesia, bleeding, infection, damage to surrounding organs such as bowel or bladder and poor wound healing. Pt expressed understanding and all questions were answered.    Pt is also interested in permanent sterilization and ovarian cancer risk reduction with salpingectomy. Will proceed with primary  section with bilateral salpingectomy.     Flora Lopes MD    
Daytime attending ordered a d-dimer given 2 syncopal episodes and persistent tachycardia and no ambulation due to concerns for a PE.  D-dimer significantly elevated although in setting of post-partum  however given vitals and symptoms will check a CT chest to ensure no PE given no risk factors with contrast.    
Department of Obstetrics and Gynecology  Labor and Delivery   Midwifery Progress Note      SUBJECTIVE: To pt bedside, @ 15:13 pt had blood noted in her urine, urine bright red appearing, contractions increased at this time, fetus began  to have late decels, interventions per protocol,  terbutaline given at 1608 pt had another void ar 1613, increased blood noted in urine, cervical exam performed, moderate amount of vaginal bleeding noted.  Pt states she is not having \" a lot of pain \" but is very uncomfortable, abd palpates moderate , soft resting tone noted in between . Dr Lopes called for concern of abruption at 1628.    OBJECTIVE:      Fetal heart rate:       Baseline Heart Rate:  140        Accelerations:  absent       Variability:  moderate       Decelerations:  late  , recurrent        Contraction frequency: 1 minute    Membranes:  Intact    Cervix:         Dilation:  1 -2cm         Effacement:  50%         Station:  -2         Position:  mid             ASSESSMENT     Pt with severe Pre Eclampsia on mag   Tachy systole  New vaginal bleeding      PLAN:    Dr Lopes en route to evaluate         LESLY Alamo CNM  
I have reviewed the patient's chart and H/P and first assisted on this patient's  delivery.     LESLY Alamo CNM  
Subjective:     Postpartum Day 1:  Delivery    The patient feels well. The patient reports emotional concerns, she has not seen her baby much and would like to hold him. She is tearful. She is currently in the nursery and is about to hold her baby. Pain is well controlled with current medications. The baby isin SCN.  Urinary output is adequate. The patient is not yet ambulating well. The patient is tolerating a normal diet. Flatus has not been passed.    Objective:    VITALS:  /73   Pulse 86   Temp 98.6 °F (37 °C) (Oral)   Resp 17   Ht 1.803 m (5' 11\")   Wt 72.6 kg (160 lb)   SpO2 99%   Breastfeeding Unknown   BMI 22.32 kg/m²   24HR INTAKE/OUTPUT:    Intake/Output Summary (Last 24 hours) at 3/14/2025 1101  Last data filed at 3/14/2025 0936  Gross per 24 hour   Intake 5419.58 ml   Output 3820 ml   Net 1599.58 ml       Vitals:    25 1050   BP: 122/73   Pulse: 86   Resp: 17   Temp:    SpO2: 99%         General:    alert, appears stated age, and cooperative       Lochia:  appropriate   Uterine Fundus:   firm   Incision:  healing well, no significant drainage, no dehiscence, no significant erythema   DVT Evaluation:  No evidence of DVT seen on physical exam.     CBC   Lab Results   Component Value Date    WBC 21.1 (H) 2025    HGB 8.6 (L) 2025    HCT 25.2 (L) 2025    MCV 93.0 2025     2025        Assessment:     Status post  section. Doing well postoperatively.     Plan:     Continue current care.  Severe preeclampsia: on Magnesium Will continue for 24 hrs. Will continue to monitor BPs.   Anemia: will start iron     
Subjective:     Postpartum Day 2:  Delivery    The patient feels well. The patient denies emotional concerns. Pain is well controlled with current medications. The baby is in special care nursery.  Urinary output is . The patient is ambulating well. The patient is tolerating a normal diet. Flatus has been passed. She has still felt dizzines and lightheaded with standing    Objective:    VITALS:  BP (!) 190/77   Pulse (!) 108   Temp 99.3 °F (37.4 °C) (Oral)   Resp 18   Ht 1.803 m (5' 11\")   Wt 72.6 kg (160 lb)   SpO2 98%   Breastfeeding Unknown   BMI 22.32 kg/m²   24HR INTAKE/OUTPUT:    Intake/Output Summary (Last 24 hours) at 3/15/2025 0901  Last data filed at 3/15/2025 0845  Gross per 24 hour   Intake 2558.94 ml   Output 6574 ml   Net -4015.06 ml       Vitals:    03/15/25 0855   BP: (!) 190/77   Pulse: (!) 108   Resp:    Temp:    SpO2:          General:    alert, appears stated age, and cooperative       Lochia:  appropriate   Uterine Fundus:   firm   Incision:  healing well, no significant drainage, no dehiscence, no significant erythema   DVT Evaluation:  No evidence of DVT seen on physical exam.     CBC   Lab Results   Component Value Date    WBC 20.0 (H) 2025    HGB 8.3 (L) 2025    HCT 24.2 (L) 2025    MCV 92.0 2025     2025        Assessment:     Status post  section. Doing well postoperatively.     Plan:     Continue current care.  Severe preeclampsia - s/p magnesium. Will continue to treat severe range BPs prn. I am concerned to start a longer acting hypertensive medication with her dizziness and syncopal episodes out of concern for labile BPs. Will continue to monitor for now. When pt is comfortable walking, will DC [place] and can transfer to mother baby.   Anemia - Hgb has been stable, but her drop from admission to post-op after an abruption could be contributing to her dizziness. Will start IV iron.     
Subjective:     Postpartum Day 3:   The patient is still frequently feeling dizziness, losing vision. She states she felt dizzy while sitting today. She has had one syncopal and one pre-syncopal episonde. The patient denies emotional concerns. Pain is well controlled with current medications. The baby iswell.   The patient is not yet ambulating well. The patient is tolerating a normal diet.    Objective:        Vitals:    03/16/25 1151   BP: (!) 146/79   Pulse: (!) 113   Resp: 18   Temp: 99.3 °F (37.4 °C)   SpO2: 98%       Lab Results   Component Value Date    WBC 14.7 (H) 03/16/2025    HGB 7.0 (L) 03/16/2025    HCT 24.8 (L) 03/16/2025    .2 (H) 03/16/2025     03/16/2025       General:    alert, appears stated age, and cooperative       Lochia:  appropriate   Uterine    firm       DVT Evaluation:  No evidence of DVT seen on physical exam.     Assessment:     Postpartum day 1 Doing well post delivery.     Plan:     Continue current care.  Preeclampsia: s/p magnesium. Pt has required a few doses of IV HTN meds. Will start Procardia 30 XL.  Dizziness: syncopal and pre-syncopal episodes. Hgb now 7, with dizziness, tachycardia. Pt is s/p iron transfusion. Will transfuse 1 unit prbc and recheck h/h.     Flora Lopes MD 3/16/2025 12:03 PM      
Subjective:     Postpartum Day 4:  Delivery with PRE E with severe features and s/p placental abruption    The patient feels better but tired. The patient denies emotional concerns. Pain is well controlled with current medications. The baby iswell.  Urinary output is adequate. The patient is ambulating with assistance. The patient is tolerating a normal diet. Flatus has been passed. Denies headache, epigastric pain or blurry vision. Bleeding is minimal now. Pre-syncopal feeling is improving. Was able to ambulate with assistance.    Objective:    VITALS:  BP (!) 134/95   Pulse 97   Temp 98.2 °F (36.8 °C) (Oral)   Resp 18   Ht 1.803 m (5' 11\")   Wt 72.6 kg (160 lb)   SpO2 98%   Breastfeeding Unknown   BMI 22.32 kg/m²     Vitals:    25 0757   BP: (!) 134/95   Pulse: 97   Resp: 18   Temp: 98.2 °F (36.8 °C)   SpO2: 98%         General:    alert, appears stated age, and cooperative       Lochia:  appropriate   Uterine Fundus:   firm   Incision:  healing well, no significant drainage, no dehiscence, no significant erythema   DVT Evaluation:  No evidence of DVT seen on physical exam.     CBC   Lab Results   Component Value Date    WBC 12.7 (H) 2025    HGB 8.6 (L) 2025    HCT 26.1 (L) 2025    MCV 94.6 2025     2025    LFTS are up.     ALT 69    Assessment:     Status post  section. Doing well postoperatively.  Improving s/p 1 unit PRBCs and Venofer.  HGB is stable this am at 8.6  LFTs have bumped. 2x normal.  BPs are elevated but rare severe range pressure now.    Improving s/p abruption with PRE E.  MGSO4 is off.   LFTs are increasing.  I suspect this is yet from PRE E  Will repeat labs in the AM.  BPs are elevated. Possible home tomorrow if she is gaining strength and improving.   Patient really needs to be able to ambulate without assistance prior to D/C.    Plan:   .    
  Pertinent positives and negatives discussed in HPI    Objective:     Intake/Output Summary (Last 24 hours) at 3/18/2025 1053  Last data filed at 3/17/2025 1738  Gross per 24 hour   Intake --   Output 1550 ml   Net -1550 ml        Vitals:   Vitals:    03/18/25 0842   BP: 139/87   Pulse: 88   Resp: 18   Temp: 99.2 °F (37.3 °C)   SpO2: 98%       Physical Exam:   General: NAD  Eyes: EOMI  ENT: neck supple  Cardiovascular: Regular rate and rhythm  Respiratory: Clear to auscultation, respirations even and unlabored on RA  Gastrointestinal: Soft, non tender  Genitourinary: no suprapubic tenderness  Musculoskeletal: No edema  Skin: warm, dry  Neuro: Alert.  Psych: Mood appropriate.    Medications:   Medications:    labetalol  100 mg Oral 2 times per day    NIFEdipine  30 mg Oral Daily    ferrous sulfate  325 mg Oral BID WC    sodium chloride flush  5-40 mL IntraVENous 2 times per day    acetaminophen  1,000 mg Oral 3 times per day    ibuprofen  800 mg Oral Q8H    docusate sodium  100 mg Oral BID    measles, mumps & rubella vaccine  0.5 mL SubCUTAneous Prior to discharge    Tdap-Dtap  0.5 mL IntraMUSCular Prior to discharge    enoxaparin  40 mg SubCUTAneous Daily    prenatal vitamin  1 each Oral Daily      Infusions:    sodium chloride       PRN Meds: sodium chloride flush, 5-40 mL, PRN  sodium chloride, , PRN  ondansetron, 4 mg, Q8H PRN   Or  ondansetron, 4 mg, Q6H PRN  lanolin, , Q1H PRN  famotidine, 20 mg, BID PRN        Labs      Recent Results (from the past 24 hours)   Hemoglobin and Hematocrit    Collection Time: 03/17/25  2:43 PM   Result Value Ref Range    Hemoglobin 9.1 (L) 12.5 - 16.0 g/dL    Hematocrit 28.0 (L) 37.0 - 47.0 %   D-Dimer Test    Collection Time: 03/17/25  2:43 PM   Result Value Ref Range    D-Dimer, Quant 1.74 (H) 0.00 - 0.46 ug/mL FEU   CBC with Diff    Collection Time: 03/18/25  6:10 AM   Result Value Ref Range    WBC 10.2 4.0 - 10.5 k/uL    RBC 2.59 (L) 4.20 - 5.40 m/uL    Hemoglobin 8.2 (L) 
am.   Dizziness - possibly related to anemia, now seems to have resolved now that vitals are in the normal range. Will continue to monitor vitals through the day.     Consider DC to home today if vitals are normal, symptoms remain improved and cleared for DC by IM. If symptoms or tachycardia persist - will consult cardiology.

## 2025-03-18 NOTE — FLOWSHEET NOTE
ID bands checked. Infant's ID band removed and stapled to footprint sheet, the mother verified as correct, signed and witnessed by nurse. Hugs tag removed. Mother of baby signed Safe Baby Crib Form verifying that she does have a safe crib for baby at home. Discharge instructions given and reviewed. Patient voiced understanding. Rx's reviewed and Electronically sent to Patient Pharmacy. Written and verbal education provided about opiate side effects and possibility of addiction. Patient voiced understanding. Patient is ambulating well at discharge with pain #0. Pt's  is driving patient and baby home. Mother verbalizes understanding to follow-up with Pediatric Provider in 1-3 days, and OB Provider Dr. Lopes for BP check later this week as instructed. Baby pink, harnessed into carseat at discharge by parents. Parents and baby escorted to hospital exit by nurse.

## 2025-03-18 NOTE — FLOWSHEET NOTE
RN called Dr Lopes with patient update. Patient requesting to be discharged home. Patient states she feels great. Patient denies any dizziness, lightheadedness, or headache. Patient ambulating without difficulty. VS and lab results reviewed with Dr Lopes.     Per Dr Lopes - OK to discharge home with Rx for Labetalol 100mg BID, NOT Procardia. Follow up in the office for a BP check later this week.     Notified FERMIN Russell CNM.

## 2025-03-18 NOTE — FLOWSHEET NOTE
Perfect Serve sent to hospitalist about patient D Dimer results. Order received for a CT. Patient was taken to CT after breastfeeding infant at 2150. As we were leaving unit patient complained of headache. Denies blurry vision, RUQ pain, N/V, dizziness or feeling lightheaded. Arrive in CT2 and patient transfers self to CT table as a stand and pivot. CT complete, patient assisted to a seated position on side of CT table by this RN and CT tech, states that she feels lightheaded. After approximately 1 minute of sitting patient states that she feels better and can transfer back to bed as stand and pivot. Patient transferred back to LD3 and vitals are taken as charted at 2209. At 2253 hospitalist christiane served to advise results are in the chart from the CT. No additional orders received. Dr Lopes on unit and was advised of the results as well.

## 2025-03-18 NOTE — FLOWSHEET NOTE
Patient requesting to take a shower. Patient ate lunch at 1130 and denies feeling lightheaded.   Shower order received and tele tech advised that patient will be off the monitor for the shower. Shower chair provided and RN remained at patient's side while she ambulated to the shower chair in the bathroom.

## 2025-03-19 LAB
MICROORGANISM SPEC CULT: ABNORMAL
SERVICE CMNT-IMP: ABNORMAL
SPECIMEN DESCRIPTION: ABNORMAL

## 2025-03-22 LAB
MICROORGANISM SPEC CULT: NORMAL
MICROORGANISM SPEC CULT: NORMAL
SERVICE CMNT-IMP: NORMAL
SERVICE CMNT-IMP: NORMAL
SPECIMEN DESCRIPTION: NORMAL
SPECIMEN DESCRIPTION: NORMAL

## (undated) DEVICE — BLOOD TRANSFER DEVICE: Brand: MEDLINE

## (undated) DEVICE — SOLUTION IRRIG 1000ML 0.9% SOD CHL USP POUR PLAS BTL

## (undated) DEVICE — Device

## (undated) DEVICE — PENCIL SMK EVAC 15FT BLADE ELECTRD ROCKER F/TELSCP

## (undated) DEVICE — CONTAINER SPEC 172OZ MP STOR

## (undated) DEVICE — ELECTRODE ES AD CRDLSS PT RET REM POLYHESIVE

## (undated) DEVICE — SUTURE VICRYL ABSRB BRAID COAT UD CTX 3-0 36IN  J980H

## (undated) DEVICE — BARRIER ADH W3XL4IN UTER PELV ABSRB GYNECARE INTCEED

## (undated) DEVICE — SUTURE VICRYL SZ 0 L36IN ABSRB VLT L36MM CT-1 1/2 CIR J346H

## (undated) DEVICE — GARMENT,MEDLINE,DVT,INT,CALF,MED, GEN2: Brand: MEDLINE

## (undated) DEVICE — SENSOR PLSE OXMTR AD CBL L3FT ADH TRANSMISSIVE

## (undated) DEVICE — SUTURE MONOCRYL SZ 4-0 L27IN ABSRB UD L24MM PS-1 3/8 CIR PRIM Y935H

## (undated) DEVICE — BABY BLANKET KIT: Brand: MEDLINE INDUSTRIES, INC.

## (undated) DEVICE — CLEANER,CAUTERY TIP,2X2",STERILE: Brand: MEDLINE

## (undated) DEVICE — GOWN,ECLIPSE,POLYRNF,BRTHSLV,L,30/CS: Brand: MEDLINE

## (undated) DEVICE — APPLICATOR MEDICATED 26 CC SOLUTION HI LT ORNG CHLORAPREP

## (undated) DEVICE — SKIN PREP TRAY 4 COMPARTM TRAY: Brand: MEDLINE INDUSTRIES, INC.

## (undated) DEVICE — SHEET,DRAPE,53X77,STERILE: Brand: MEDLINE

## (undated) DEVICE — SUTURE MONOCRYL SZ 0 L36IN ABSRB UD L36MM CT-1 1/2 CIR Y946H

## (undated) DEVICE — GLOVE SURG SZ 65 L12IN THK75MIL DK GRN LTX FREE

## (undated) DEVICE — GOWN,SIRUS,POLYRNF,BRTHSLV,LG,30/CS: Brand: MEDLINE

## (undated) DEVICE — GLOVE SURG SZ 6 CRM LTX FREE POLYISOPRENE POLYMER BEAD ANTI

## (undated) DEVICE — MARKER SURG SKIN UTIL REGULAR/FINE 2 TIP W/ RUL AND 9 LBL

## (undated) DEVICE — SINGLE PORT MANIFOLD: Brand: NEPTUNE 2

## (undated) DEVICE — 1LYRTR 16FR10ML 100%SILI SNAP: Brand: MEDLINE INDUSTRIES, INC.

## (undated) DEVICE — MATTRESS MAXI AIR TRNSF SGL PT USE DCS 37 45 48 52 75

## (undated) DEVICE — SOLUTION IRRIG 1000ML STRL H2O USP PLAS POUR BTL

## (undated) DEVICE — TOWEL,OR,DSP,ST,BLUE,STD,6/PK,12PK/CS: Brand: MEDLINE

## (undated) DEVICE — SUTURE VICRYL 3-0 L36IN ABSRB VLT CT-1 L36MM 1/2 CIR J344H